# Patient Record
Sex: FEMALE | Race: BLACK OR AFRICAN AMERICAN | Employment: FULL TIME | URBAN - METROPOLITAN AREA
[De-identification: names, ages, dates, MRNs, and addresses within clinical notes are randomized per-mention and may not be internally consistent; named-entity substitution may affect disease eponyms.]

---

## 2017-03-02 ENCOUNTER — OFFICE VISIT (OUTPATIENT)
Dept: FAMILY MEDICINE CLINIC | Age: 57
End: 2017-03-02

## 2017-03-02 VITALS
WEIGHT: 131 LBS | SYSTOLIC BLOOD PRESSURE: 102 MMHG | OXYGEN SATURATION: 100 % | DIASTOLIC BLOOD PRESSURE: 70 MMHG | HEART RATE: 67 BPM | TEMPERATURE: 98.7 F | RESPIRATION RATE: 18 BRPM | HEIGHT: 63 IN | BODY MASS INDEX: 23.21 KG/M2

## 2017-03-02 DIAGNOSIS — M79.7 FIBROMYALGIA: ICD-10-CM

## 2017-03-02 DIAGNOSIS — M50.30 DDD (DEGENERATIVE DISC DISEASE), CERVICAL: Primary | ICD-10-CM

## 2017-03-02 DIAGNOSIS — M51.36 DDD (DEGENERATIVE DISC DISEASE), LUMBAR: ICD-10-CM

## 2017-03-02 DIAGNOSIS — Z11.59 SCREENING FOR VIRAL DISEASE: ICD-10-CM

## 2017-03-02 RX ORDER — TRAMADOL HYDROCHLORIDE 50 MG/1
TABLET ORAL
Qty: 90 TAB | Refills: 2 | Status: SHIPPED | OUTPATIENT
Start: 2017-03-02 | End: 2017-05-23 | Stop reason: SDUPTHER

## 2017-03-02 NOTE — MR AVS SNAPSHOT
Visit Information Date & Time Provider Department Dept. Phone Encounter #  
 3/2/2017  1:30 PM Luis A Ureña NP [address] [facility] [phone] [id number] Follow-up Instructions Return if symptoms worsen or fail to improve. Upcoming Health Maintenance Date Due Hepatitis C Screening 1960 FOBT Q 1 YEAR AGE 50-75 7/20/2017 BREAST CANCER SCRN MAMMOGRAM 11/21/2018 PAP AKA CERVICAL CYTOLOGY 11/21/2019 DTaP/Tdap/Td series (2 - Td) 11/21/2026 Allergies as of 3/2/2017  Review Complete On: 3/2/2017 By: Luis A Ureña NP Severity Noted Reaction Type Reactions Doxycycline  12/06/2013    Other (comments)  
 headache  
 Sulfa (Sulfonamide Antibiotics)  12/06/2013    Other (comments)  
 headache Current Immunizations  Never Reviewed No immunizations on file. Not reviewed this visit You Were Diagnosed With   
  
 Codes Comments DDD (degenerative disc disease), cervical    -  Primary ICD-10-CM: M50.30 ICD-9-CM: 722.4 DDD (degenerative disc disease), lumbar     ICD-10-CM: M51.36 
ICD-9-CM: 722.52 Fibromyalgia     ICD-10-CM: M79.7 ICD-9-CM: 729.1 Vitals BP  
  
  
  
  
  
 102/70 (BP 1 Location: Left arm, BP Patient Position: Sitting) Vitals History BMI and BSA Data Body Mass Index Body Surface Area  
 23.21 kg/m 2 1.62 m 2 Preferred Pharmacy Pharmacy Name Phone [id number] [place] [address] [phone] Your Updated Medication List  
  
   
This list is accurate as of: 3/2/17  1:52 PM.  Always use your most recent med list.  
  
  
  
  
 meloxicam 15 mg tablet Commonly known as:  MOBIC Take 1 Tab by mouth daily. methocarbamol 750 mg tablet Commonly known as:  ROBAXIN Take one tablet by mouth three times daily as needed  
  
 pantoprazole 40 mg tablet Commonly known as:  PROTONIX TAKE 1 TABLET BY MOUTH TWICE A DAY  
  
 scopolamine 1.5 mg (1 mg over 3 days) Pt3d Commonly known as:  TRANSDERM-SCOP  
1 Patch by TransDERmal route every seventy-two (72) hours. For motion sickness  
  
 traMADol 50 mg tablet Commonly known as:  ULTRAM  
TAKE ONE TABLET BY MOUTH EVERY SIX HOURS AS NEEDED FOR PAIN Prescriptions Printed Refills  
 traMADol (ULTRAM) 50 mg tablet 2 Sig: TAKE ONE TABLET BY MOUTH EVERY SIX HOURS AS NEEDED FOR PAIN Class: Print Follow-up Instructions Return if symptoms worsen or fail to improve. Introducing [facility]! Dear Jorge May: Thank you for requesting a Rhapsody account. Our records indicate that you already have an active Rhapsody account. You can access your account anytime at [email or web address] (Formerly Play-i). LumiGrow/Wonder Workshop (Formerly Play-i) Did you know that you can access your hospital and ER discharge instructions at any time in Rhapsody? You can also review all of your test results from your hospital stay or ER visit. Additional Information If you have questions, please visit the Frequently Asked Questions section of the Rhapsody website at [email or web address] (Formerly Play-i). LumiGrow/Wonder Workshop (Formerly Play-i)/. Remember, Rhapsody is NOT to be used for urgent needs. For medical emergencies, dial 911. Now available from your iPhone and Android! Please provide this summary of care documentation to your next provider. Your primary care clinician is listed as Aura Bills. If you have any questions after today's visit, please call [phone].

## 2017-03-02 NOTE — PROGRESS NOTES
Chief Complaint   Patient presents with    Pain (Chronic)     fibromyalgia    Medication Evaluation     she is a 49y.o. year old female who presents for evalution. Pt here for Tramadol refills. Was given Mobic and Diclofenac gel and states Mobic didn't help at all. Diclofenac gel helped for week and then stopped helping as much. Pt has been seeing new chiropractor and feels like neck pain has been improving. Reviewed PmHx, RxHx, FmHx, SocHx, AllgHx and updated and dated in the chart. Review of Systems - negative except as listed above in the HPI    Objective:     Vitals:    03/02/17 1336   BP: 102/70   Pulse: 67   Resp: 18   Temp: 98.7 °F (37.1 °C)   TempSrc: Oral   SpO2: 100%   Weight: 131 lb (59.4 kg)   Height: 5' 3\" (1.6 m)     Physical Examination: General appearance - alert, well appearing, and in no distress  Chest - clear to auscultation, no wheezes, rales or rhonchi, symmetric air entry  Heart - normal rate, regular rhythm, normal S1, S2, no murmurs, rubs, clicks or gallops    Assessment/ Plan:   Isis was seen today for pain (chronic) and medication evaluation. Diagnoses and all orders for this visit:    DDD (degenerative disc disease), cervical  -     traMADol (ULTRAM) 50 mg tablet; TAKE ONE TABLET BY MOUTH EVERY SIX HOURS AS NEEDED FOR PAIN  Reviewed , no data on patient. Refills provided. F/U 3 mo or as needed. DDD (degenerative disc disease), lumbar  -     traMADol (ULTRAM) 50 mg tablet; TAKE ONE TABLET BY MOUTH EVERY SIX HOURS AS NEEDED FOR PAIN    Fibromyalgia  -     traMADol (ULTRAM) 50 mg tablet; TAKE ONE TABLET BY MOUTH EVERY SIX HOURS AS NEEDED FOR PAIN    Screening for viral disease  -     HEPATITIS C AB    Pt voiced understanding regarding plan of care. Follow-up Disposition:  Return if symptoms worsen or fail to improve. I have discussed the diagnosis with the patient and the intended plan as seen in the above orders.   The patient has received an after-visit summary and questions were answered concerning future plans.      Medication Side Effects and Warnings were discussed with patient    Kirsten Orta NP

## 2017-03-02 NOTE — PROGRESS NOTES
Chief Complaint   Patient presents with    Pain (Chronic)     fibromyalgia    Medication Evaluation     Patient in office today for med check; have no c/o.    1. Have you been to the ER, urgent care clinic since your last visit? Hospitalized since your last visit? No    2. Have you seen or consulted any other health care providers outside of the [address] [place] [address] since your last visit? Include any pap smears or colon screening.  No

## 2017-03-03 LAB — HCV AB S/CO SERPL IA: <0.1 S/CO RATIO (ref 0–0.9)

## 2017-05-01 DIAGNOSIS — M79.7 FIBROMYALGIA: ICD-10-CM

## 2017-05-01 DIAGNOSIS — M50.30 DDD (DEGENERATIVE DISC DISEASE), CERVICAL: ICD-10-CM

## 2017-05-01 RX ORDER — METHOCARBAMOL 750 MG/1
TABLET, FILM COATED ORAL
Qty: 180 TAB | Refills: 3 | Status: SHIPPED | OUTPATIENT
Start: 2017-05-01 | End: 2017-05-02 | Stop reason: SDUPTHER

## 2017-05-02 ENCOUNTER — TELEPHONE (OUTPATIENT)
Dept: FAMILY MEDICINE CLINIC | Age: 57
End: 2017-05-02

## 2017-05-02 DIAGNOSIS — M79.7 FIBROMYALGIA: ICD-10-CM

## 2017-05-02 DIAGNOSIS — M50.30 DDD (DEGENERATIVE DISC DISEASE), CERVICAL: ICD-10-CM

## 2017-05-02 RX ORDER — METHOCARBAMOL 750 MG/1
TABLET, FILM COATED ORAL
Qty: 30 TAB | Refills: 0 | Status: SHIPPED | OUTPATIENT
Start: 2017-05-02 | End: 2017-10-12 | Stop reason: SDUPTHER

## 2017-05-02 NOTE — TELEPHONE ENCOUNTER
Pt is out of Methocarbamol, pt is asking if you can send in a small amount to local pharmacy on file until she receives her mail order rx. CB# [phone].

## 2017-05-23 ENCOUNTER — OFFICE VISIT (OUTPATIENT)
Dept: FAMILY MEDICINE CLINIC | Age: 57
End: 2017-05-23

## 2017-05-23 VITALS
BODY MASS INDEX: 23.1 KG/M2 | RESPIRATION RATE: 18 BRPM | DIASTOLIC BLOOD PRESSURE: 86 MMHG | SYSTOLIC BLOOD PRESSURE: 134 MMHG | TEMPERATURE: 98.4 F | HEART RATE: 75 BPM | HEIGHT: 63 IN | OXYGEN SATURATION: 99 % | WEIGHT: 130.38 LBS

## 2017-05-23 DIAGNOSIS — R51.9 CHRONIC DAILY HEADACHE: Primary | ICD-10-CM

## 2017-05-23 DIAGNOSIS — M79.7 FIBROMYALGIA: ICD-10-CM

## 2017-05-23 DIAGNOSIS — M51.36 DDD (DEGENERATIVE DISC DISEASE), LUMBAR: ICD-10-CM

## 2017-05-23 DIAGNOSIS — M50.30 DDD (DEGENERATIVE DISC DISEASE), CERVICAL: ICD-10-CM

## 2017-05-23 RX ORDER — TRAMADOL HYDROCHLORIDE 50 MG/1
TABLET ORAL
Qty: 90 TAB | Refills: 2 | Status: SHIPPED | OUTPATIENT
Start: 2017-05-23 | End: 2018-10-22 | Stop reason: ALTCHOICE

## 2017-05-23 NOTE — PROGRESS NOTES
1. Have you been to the ER, urgent care clinic since your last visit? Hospitalized since your last visit? No    2. Have you seen or consulted any other health care providers outside of the [address] [place] [address] since your last visit? Include any pap smears or colon screening.  No   Chief Complaint   Patient presents with    Medication Refill     tramadol refill

## 2017-05-23 NOTE — PROGRESS NOTES
Chief Complaint   Patient presents with    Medication Refill     tramadol refill     she is a 49y.o. year old female who presents for evalution. Pt here today to discuss chronic headaches. Has been taking Tramadol but doesn't really help. Has also been seeing [facility] as well for this problem - making some dietary changes, has some hormone testing pending still, otherwise labs have been looking good. Pt states headaches have been worsening. We have discussed previously Occipital Nerve Block injections, here today to get more information. Pt has seen Neuro previously but hasn't helped. Reviewed PmHx, RxHx, FmHx, SocHx, AllgHx and updated and dated in the chart. Review of Systems - negative except as listed above in the HPI    Objective:     Vitals:    05/23/17 1020   BP: 134/86   Pulse: 75   Resp: 18   Temp: 98.4 °F (36.9 °C)   TempSrc: Oral   SpO2: 99%   Weight: 130 lb 6 oz (59.1 kg)   Height: 5' 3\" (1.6 m)     Physical Examination: General appearance - alert, well appearing, and in no distress  Neck - supple, no significant adenopathy  Chest - clear to auscultation, no wheezes, rales or rhonchi, symmetric air entry  Heart - normal rate, regular rhythm, normal S1, S2, no murmurs, rubs, clicks or gallops  Neurological - alert, oriented, normal speech, no focal findings or movement disorder noted, cranial nerves II through XII intact, motor and sensory grossly normal bilaterally    Assessment/ Plan:   Isis was seen today for medication refill. Diagnoses and all orders for this visit:    Chronic daily headache  -     traMADol (ULTRAM) 50 mg tablet; TAKE ONE TABLET BY MOUTH EVERY SIX HOURS AS NEEDED FOR PAIN  -     REFERRAL TO PAIN MANAGEMENT  Slightly early but will refill. Pain contract filed. Reviewed , no suspicious fills. F/U with PMR to discuss occipital injections. Also given supplement information which can help with chronic pain.      Fibromyalgia  - traMADol (ULTRAM) 50 mg tablet; TAKE ONE TABLET BY MOUTH EVERY SIX HOURS AS NEEDED FOR PAIN    DDD (degenerative disc disease), cervical  -     traMADol (ULTRAM) 50 mg tablet; TAKE ONE TABLET BY MOUTH EVERY SIX HOURS AS NEEDED FOR PAIN  -     REFERRAL TO PAIN MANAGEMENT    DDD (degenerative disc disease), lumbar  -     traMADol (ULTRAM) 50 mg tablet; TAKE ONE TABLET BY MOUTH EVERY SIX HOURS AS NEEDED FOR PAIN    Pt voiced understanding regarding plan of care. Follow-up Disposition:  Return in about 3 months (around 8/23/2017) for Tramadol . I have discussed the diagnosis with the patient and the intended plan as seen in the above orders. The patient has received an after-visit summary and questions were answered concerning future plans.      Medication Side Effects and Warnings were discussed with patient    Nixon Donato NP

## 2017-05-23 NOTE — MR AVS SNAPSHOT
Visit Information Date & Time Provider Department Dept. Phone Encounter #  
 5/23/2017 10:15 AM Feroz Mclean NP [address] [facility] [phone] [id number] Follow-up Instructions Return in about 3 months (around 8/23/2017) for Tramadol . Upcoming Health Maintenance Date Due FOBT Q 1 YEAR AGE 50-75 7/20/2017 INFLUENZA AGE 9 TO ADULT 8/1/2017 BREAST CANCER SCRN MAMMOGRAM 11/21/2018 PAP AKA CERVICAL CYTOLOGY 11/21/2019 DTaP/Tdap/Td series (2 - Td) 11/21/2026 Allergies as of 5/23/2017  Review Complete On: 5/23/2017 By: Feroz Mclean NP Severity Noted Reaction Type Reactions Doxycycline  12/06/2013    Other (comments)  
 headache  
 Sulfa (Sulfonamide Antibiotics)  12/06/2013    Other (comments)  
 headache Current Immunizations  Never Reviewed No immunizations on file. Not reviewed this visit You Were Diagnosed With   
  
 Codes Comments Chronic daily headache    -  Primary ICD-10-CM: P67 ICD-9-CM: 784.0 Fibromyalgia     ICD-10-CM: M79.7 ICD-9-CM: 729.1 DDD (degenerative disc disease), cervical     ICD-10-CM: M50.30 ICD-9-CM: 722.4 DDD (degenerative disc disease), lumbar     ICD-10-CM: M51.36 
ICD-9-CM: 722.52 Vitals BP Pulse Temp Resp Height(growth percentile) Weight(growth percentile) 134/86 (BP 1 Location: Left arm, BP Patient Position: Sitting) 75 98.4 °F (36.9 °C) (Oral) 18 5' 3\" (1.6 m) 130 lb 6 oz (59.1 kg) SpO2 BMI OB Status Smoking Status 99% 23.09 kg/m2 Menopause Never Smoker Vitals History BMI and BSA Data Body Mass Index Body Surface Area 23.09 kg/m 2 1.62 m 2 Preferred Pharmacy Pharmacy Name Phone [id number] [address] [facility] [place] [address] [phone] Your Updated Medication List  
  
   
This list is accurate as of: 5/23/17 11:01 AM.  Always use your most recent med list.  
  
  
  
 meloxicam 15 mg tablet Commonly known as:  MOBIC Take 1 Tab by mouth daily. methocarbamol 750 mg tablet Commonly known as:  ROBAXIN Take one tablet by mouth three times daily as needed  
  
 pantoprazole 40 mg tablet Commonly known as:  PROTONIX  
TAKE 1 TABLET BY MOUTH TWICE A DAY  
  
 scopolamine 1.5 mg (1 mg over 3 days) Pt3d Commonly known as:  TRANSDERM-SCOP  
1 Patch by TransDERmal route every seventy-two (72) hours. For motion sickness  
  
 traMADol 50 mg tablet Commonly known as:  ULTRAM  
TAKE ONE TABLET BY MOUTH EVERY SIX HOURS AS NEEDED FOR PAIN Prescriptions Printed Refills  
 traMADol (ULTRAM) 50 mg tablet 2 Sig: TAKE ONE TABLET BY MOUTH EVERY SIX HOURS AS NEEDED FOR PAIN Class: Print We Performed the Following REFERRAL TO PAIN MANAGEMENT [[id number] Custom] Comments:  
 Please evaluate patient for chronic daily headache, chronic neck pain Follow-up Instructions Return in about 3 months (around 8/23/2017) for Tramadol . Referral Information Referral ID Referred By Referred To  
  
 [id number] Sakshi NICKERSON, [address] [place], [address] [place] [address] Phone: [phone] Fax: [phone] Visits Status Start Date End Date 1 New Request 5/23/17 5/23/18 If your referral has a status of pending review or denied, additional information will be sent to support the outcome of this decision. Introducing [facility]! Dear Margarita Qiu: Thank you for requesting a Taykey account. Our records indicate that you already have an active Taykey account. You can access your account anytime at [email or web address] Media/Wouzee Media Did you know that you can access your hospital and ER discharge instructions at any time in Taykey? You can also review all of your test results from your hospital stay or ER visit. Additional Information If you have questions, please visit the Frequently Asked Questions section of the Lili B Enterpriseshart website at [email or web address] Remember, Zoosk is NOT to be used for urgent needs. For medical emergencies, dial 911. Now available from your iPhone and Android! Please provide this summary of care documentation to your next provider. Your primary care clinician is listed as Buddy Wood. If you have any questions after today's visit, please call [phone].

## 2017-05-23 NOTE — LETTER
[address] :1960 MR #:[id number] Provider Naman Valente NP  
*LXDT-056* BSMG-491 () Page 1 of 5 Initial Phoneplus CONTROLLED SUBSTANCE AGREEMENT I may be prescribed medications that are controlled substances as part  of my treatment plan for management of my medical condition(s). The goal of my treatment plan is to maintain and/or improve my health and wellbeing. Because controlled substances have an increased risk of abuse or harm, continual re-evaluation is needed determine if the goals of my treatment plan are being met for my safety and the safety of others. Naijos Strauss  am entering into this Controlled Substance Agreement with my provider, Alessandro Giang NP at [address] . I understand that successful treatment requires mutual trust and honesty between me and my provider. I understand that there are state and federal laws and regulations which apply to the medications that my provider may prescribe that must be followed. I understand there are risks and benefits ts of taking the medicines that my provider may prescribe. I understand and agree that following this Agreement is necessary in continuing my provider-patient relationship and success of my treatment plan. As a part of my treatment plan, I agree to the following: COMMUNICATION: 
 
1. I will communicate fully with my provider about my medical condition(s), including the effect on my daily life and how well my medications are helping. I will tell my provider all of the medications that I take for any reason, including medications I receive from another health care provider, and will notify my provider about all issues, problems or concerns, including any side effects, which may be related to my medications. I understand that this information allows my provider to adjust my treatment plan to help manage my medical condition.  I understand that this information will become part of my permanent medical record. 2. I will notify my provider if I have a history of alcohol/drug misuse/addiction or if I have had treatment for alcohol/drug addiction in the past, or if I have a new problem with or concern about alcohol/drug use/addiction, because this increases the likelihood of high risk behaviors and may lead to serious medical conditions. 3. Females Only: I will notify my provider if I am or become pregnant, or if I intend to become pregnant, or if I intend to breastfeed. I understand that communication of these issues with my provider is important, due to possible effects my medication could have on an unborn fetus or breastfeeding child. [address] :1960 MR #:[id number] Provider Cindi Sicard, NP  
*OXIB-337* BSMG-491 () Page 2 of 5 Initial SMARTworks MISUSE OF MEDICATIONS / DRUGS: 
 
1. I agree to take all controlled substances as prescribed, and will not misuse or abuse any controlled substances prescribed by my provider. For my safety, I will not increase the amount of medicine I take without first talking with and getting permission from my provider. 2. If I have a medical emergency, another health care provider may prescribe me medication. If I seek emergency treatment, I will notify my provider within seventy-two (72) hours. 3. I understand that my provider may discuss my use and/or possible misuse/abuse of controlled substances and alcohol, as appropriate, with any health care provider involved in my care, pharmacist or legal authority. ILLEGAL DRUGS: 
 
1. I will not use illegal drugs of any kind, including but not limited to marijuana, heroin, cocaine, or any prescription drug which is not prescribed to me. DRUG DIVERSION / PRESCRIPTION FRAUD: 
 
1. I will not share, sell, trade, give away, or otherwise misuse my prescriptions or medications. 2. I will not alter any prescriptions provided to me by my provider. SINGLE PROVIDER: 
 
1. I agree that all controlled substances that I take will be prescribed only by my provider (or his/her covering provider) under this Agreement. This agreement does not prevent me from seeking emergency medical treatment or receiving pain management related to a surgery. PROTECTING MEDICATIONS: 
 
1. I am responsible for keeping my prescriptions and medications in a safe and secure place including safeguarding them from loss or theft. I understand that lost, stolen or damaged/destroyed prescriptions or medications will not be replaced. [address] :1960 MR #:[id number] Provider Caridad Cohen NP  
*EEFO-080* BSMG-491 () Page 3 of 5 Initial Winkcam PRESCRIPTION RENEWALS/REFILLS: 
 
1. I will follow my controlled substance medication schedule as prescribed by my provider. 2. I understand and agree that I will make any requests for renewals or refills of my prescriptions only at the time of an office visit or during my providers regular office hours subject to the prescription refill requirements of the individual practice. 3. I understand that my provider may not call in prescriptions for controlled substances to my pharmacy. 4. I understand that my provider may adjust or discontinue these medications as deemed appropriate for my medical treatment plan. This Agreement does not guarantee the prescription of controlled medications. 5. I agree that if my medications are adjusted or discontinued, I will properly dispose of any remaining medications. I understand that I will be required to dispose of any remaining controlled medications prior to being provided with any prescriptions for other controlled medications.  
 
 
1. I authorize my provider and my pharmacy to cooperate fully with any local, state, or federal law enforcement agency in the investigation of any possible misuse, sale, or other diversion of my controlled substance prescriptions or medications. RISKS: 
 
 
1. I understand that if I do not adhere to this Agreement in any way, my provider may change my prescriptions, stop prescribing controlled substances or end our provider-patient relationship. 2. If my provider decides to stop prescribing medication, or decides to end our provider-patient relationship,my provider may require that I taper my medications slowly. If necessary, my provider may also provide a prescription for other medications to treat my withdrawal symptoms. UNDERSTANDING THIS AGREEMENT: 
 
I understand that my provider may adjust or stop my prescriptions for controlled substances based on my medical condition and my treatment plan. I understand that this Agreement does not guarantee that I will be prescribed medications or controlled substances. I understand that controlled substances may be just one part 
of my treatment plan. My initial on each page and my signature below shows that I have read each page of this Agreement, I have had an opportunity to ask questions, and all of my questions have been answered to my satisfaction by my provider. By signing below, I agree to comply with this Agreement, and I understand that if I do not follow the Agreements listed above, my provider may stop 
 
 
 
_________________________________________  Date/Time 5/23/2017 10:56 AM   
             (Patient Signature)

## 2017-10-12 ENCOUNTER — TELEPHONE (OUTPATIENT)
Dept: FAMILY MEDICINE CLINIC | Age: 57
End: 2017-10-12

## 2017-10-12 DIAGNOSIS — M50.30 DDD (DEGENERATIVE DISC DISEASE), CERVICAL: ICD-10-CM

## 2017-10-12 DIAGNOSIS — M79.7 FIBROMYALGIA: ICD-10-CM

## 2017-10-12 RX ORDER — METHOCARBAMOL 750 MG/1
TABLET, FILM COATED ORAL
Qty: 30 TAB | Refills: 0 | Status: SHIPPED | OUTPATIENT
Start: 2017-10-12 | End: 2018-10-22 | Stop reason: SDUPTHER

## 2017-10-12 NOTE — TELEPHONE ENCOUNTER
----- Message from Aicha Du sent at 10/12/2017 12:24 PM EDT -----  Regarding: Ari Vergara / Telephone  Pt is experiencing a delay in getting her \"methocarbamol 750 MG\" from the Mail order pharmacy so pt was wondering if  the office could call her in some pills to the local [facility] pharmacy on [address] [place] [address] in [place] and best contact number is [phone].

## 2018-10-22 ENCOUNTER — OFFICE VISIT (OUTPATIENT)
Dept: FAMILY MEDICINE CLINIC | Age: 58
End: 2018-10-22

## 2018-10-22 VITALS
WEIGHT: 136 LBS | HEIGHT: 63 IN | OXYGEN SATURATION: 99 % | SYSTOLIC BLOOD PRESSURE: 105 MMHG | HEART RATE: 59 BPM | DIASTOLIC BLOOD PRESSURE: 74 MMHG | BODY MASS INDEX: 24.1 KG/M2 | RESPIRATION RATE: 17 BRPM | TEMPERATURE: 98.1 F

## 2018-10-22 DIAGNOSIS — M50.30 DDD (DEGENERATIVE DISC DISEASE), CERVICAL: ICD-10-CM

## 2018-10-22 DIAGNOSIS — M79.7 FIBROMYALGIA: ICD-10-CM

## 2018-10-22 DIAGNOSIS — Z00.00 WELL ADULT EXAM: Primary | ICD-10-CM

## 2018-10-22 RX ORDER — BISMUTH SUBSALICYLATE 262 MG
1 TABLET,CHEWABLE ORAL DAILY
COMMUNITY

## 2018-10-22 RX ORDER — ASCORBIC ACID 500 MG
1000 TABLET ORAL 2 TIMES DAILY
COMMUNITY

## 2018-10-22 RX ORDER — METHOCARBAMOL 750 MG/1
TABLET, FILM COATED ORAL
Qty: 30 TAB | Refills: 0 | Status: SHIPPED | OUTPATIENT
Start: 2018-10-22 | End: 2018-10-22 | Stop reason: SDUPTHER

## 2018-10-22 RX ORDER — METHOCARBAMOL 750 MG/1
TABLET, FILM COATED ORAL
Qty: 90 TAB | Refills: 3 | Status: SHIPPED | OUTPATIENT
Start: 2018-10-22 | End: 2019-05-16 | Stop reason: SDUPTHER

## 2018-10-22 RX ORDER — TURMERIC 400 MG
1 CAPSULE ORAL 2 TIMES DAILY
COMMUNITY

## 2018-10-22 RX ORDER — SCOLOPAMINE TRANSDERMAL SYSTEM 1 MG/1
1.5 PATCH, EXTENDED RELEASE TRANSDERMAL
Qty: 4 PATCH | Refills: 0 | Status: SHIPPED | OUTPATIENT
Start: 2018-10-22 | End: 2020-03-16 | Stop reason: ALTCHOICE

## 2018-10-22 RX ORDER — LANOLIN ALCOHOL/MO/W.PET/CERES
1 CREAM (GRAM) TOPICAL 2 TIMES DAILY
COMMUNITY

## 2018-10-22 NOTE — PROGRESS NOTES
Chief Complaint   Patient presents with    Complete Physical    Medication Refill      1. Have you been to the ER, urgent care clinic since your last visit? Hospitalized since your last visit? No    2. Have you seen or consulted any other health care providers outside of the [address] [place] [address] since your last visit? Include any pap smears or colon screening.  No

## 2018-10-22 NOTE — PROGRESS NOTES
Subjective:   56 y.o. female for Well Woman Check. Her gyne and breast care is done elsewhere by her Ob-Gyne physician. Patient Active Problem List    Diagnosis Date Noted    Fibromyalgia 12/06/2013    DDD (degenerative disc disease), cervical 12/06/2013    DDD (degenerative disc disease), lumbar 12/06/2013     Current Outpatient Medications   Medication Sig Dispense Refill    scopolamine (TRANSDERM-SCOP) 1 mg over 3 days pt3d 1 Patch by TransDERmal route every seventy-two (72) hours. For motion sickness 4 Patch 0    calcium/mag/vitamin D2/Zn/min (ADAM-MAG ZINC II PO) Take  by mouth.  Omega-3 Fatty Acids 60- mg cpDR Take  by mouth.  multivitamin (ONE A DAY) tablet Take 1 Tab by mouth daily.  TURMERIC PO Take  by mouth.  B.infantis-B.ani-B.long-B.bifi (PROBIOTIC 4X) 10-15 mg TbEC Take  by mouth.  ascorbic acid, vitamin C, (VITAMIN C) 500 mg tablet Take 1,000 mg by mouth.  glucosamine-chondroitin (ARTHX) 500-400 mg cap Take 1 Cap by mouth daily.  methocarbamol (ROBAXIN) 750 mg tablet Take one tablet by mouth three times daily as needed 90 Tab 3     Family History   Problem Relation Age of Onset    Parkinsonism Father      Social History     Tobacco Use    Smoking status: Never Smoker    Smokeless tobacco: Never Used   Substance Use Topics    Alcohol use: Yes     Alcohol/week: 1.0 oz     Types: 2 Glasses of wine per week             ROS: Feeling generally well. No TIA's or unusual headaches, no dysphagia. No prolonged cough. No dyspnea or chest pain on exertion. No abdominal pain, change in bowel habits, black or bloody stools. No urinary tract symptoms. No new or unusual musculoskeletal symptoms. Specific concerns today: needs to get refill of her scolpalamine patch for an upcoming cruise, due for refill of her robaxin. Objective: The patient appears well, alert, oriented x 3, in no distress.   Visit Vitals  /74 (BP 1 Location: Right arm, BP Patient Position: Sitting)   Pulse (!) 59   Temp 98.1 °F (36.7 °C)   Resp 17   Ht 5' 3\" (1.6 m)   Wt 136 lb (61.7 kg)   SpO2 99%   BMI 24.09 kg/m²     ENT normal.  Neck supple. No adenopathy or thyromegaly. FERNANDO. Lungs are clear, good air entry, no wheezes, rhonchi or rales. S1 and S2 normal, no murmurs, regular rate and rhythm. Abdomen soft without tenderness, guarding, mass or organomegaly. Extremities show no edema, normal peripheral pulses. Neurological is normal, no focal findings. Breast and Pelvic exams are deferred. Assessment/Plan:   Well Woman  lose weight, increase physical activity, follow low fat diet, follow low salt diet, routine labs ordered  Encounter Diagnoses   Name Primary?  Well adult exam Yes    Fibromyalgia     DDD (degenerative disc disease), cervical      Orders Placed This Encounter    LIPID PANEL    CBC WITH AUTOMATED DIFF    METABOLIC PANEL, COMPREHENSIVE    TSH 3RD GENERATION    DISCONTD: methocarbamol (ROBAXIN) 750 mg tablet    scopolamine (TRANSDERM-SCOP) 1 mg over 3 days pt3d    calcium/mag/vitamin D2/Zn/min (ADAM-MAG ZINC II PO)    Omega-3 Fatty Acids 60- mg cpDR    multivitamin (ONE A DAY) tablet    TURMERIC PO    B.infantis-B.ani-B.long-B.bifi (PROBIOTIC 4X) 10-15 mg TbEC    ascorbic acid, vitamin C, (VITAMIN C) 500 mg tablet    glucosamine-chondroitin (ARTHX) 500-400 mg cap    methocarbamol (ROBAXIN) 750 mg tablet     I have discussed the diagnosis with the patient and the intended plan as seen in the above orders. The patient has received an after-visit summary and questions were answered concerning future plans. Patient conveyed understanding of the plan at the time of the visit.     Nubia Frankel, MSN, ANP  10/22/2018

## 2018-10-23 LAB
ALBUMIN SERPL-MCNC: 4.6 G/DL (ref 3.5–5.5)
ALBUMIN/GLOB SERPL: 1.7 {RATIO} (ref 1.2–2.2)
ALP SERPL-CCNC: 101 IU/L (ref 39–117)
ALT SERPL-CCNC: 12 IU/L (ref 0–32)
AST SERPL-CCNC: 20 IU/L (ref 0–40)
BASOPHILS # BLD AUTO: 0.1 X10E3/UL (ref 0–0.2)
BASOPHILS NFR BLD AUTO: 1 %
BILIRUB SERPL-MCNC: 0.2 MG/DL (ref 0–1.2)
BUN SERPL-MCNC: 17 MG/DL (ref 6–24)
BUN/CREAT SERPL: 24 (ref 9–23)
CALCIUM SERPL-MCNC: 9.3 MG/DL (ref 8.7–10.2)
CHLORIDE SERPL-SCNC: 102 MMOL/L (ref 96–106)
CHOLEST SERPL-MCNC: 196 MG/DL (ref 100–199)
CO2 SERPL-SCNC: 23 MMOL/L (ref 20–29)
CREAT SERPL-MCNC: 0.72 MG/DL (ref 0.57–1)
EOSINOPHIL # BLD AUTO: 0.1 X10E3/UL (ref 0–0.4)
EOSINOPHIL NFR BLD AUTO: 2 %
ERYTHROCYTE [DISTWIDTH] IN BLOOD BY AUTOMATED COUNT: 14.2 % (ref 12.3–15.4)
GLOBULIN SER CALC-MCNC: 2.7 G/DL (ref 1.5–4.5)
GLUCOSE SERPL-MCNC: 85 MG/DL (ref 65–99)
HCT VFR BLD AUTO: 38.7 % (ref 34–46.6)
HDLC SERPL-MCNC: 81 MG/DL
HGB BLD-MCNC: 12.4 G/DL (ref 11.1–15.9)
IMM GRANULOCYTES # BLD: 0 X10E3/UL (ref 0–0.1)
IMM GRANULOCYTES NFR BLD: 0 %
INTERPRETATION, 910389: NORMAL
LDLC SERPL CALC-MCNC: 95 MG/DL (ref 0–99)
LYMPHOCYTES # BLD AUTO: 1.8 X10E3/UL (ref 0.7–3.1)
LYMPHOCYTES NFR BLD AUTO: 30 %
MCH RBC QN AUTO: 30.2 PG (ref 26.6–33)
MCHC RBC AUTO-ENTMCNC: 32 G/DL (ref 31.5–35.7)
MCV RBC AUTO: 94 FL (ref 79–97)
MONOCYTES # BLD AUTO: 0.4 X10E3/UL (ref 0.1–0.9)
MONOCYTES NFR BLD AUTO: 7 %
NEUTROPHILS # BLD AUTO: 3.6 X10E3/UL (ref 1.4–7)
NEUTROPHILS NFR BLD AUTO: 60 %
PLATELET # BLD AUTO: 244 X10E3/UL (ref 150–379)
POTASSIUM SERPL-SCNC: 4.1 MMOL/L (ref 3.5–5.2)
PROT SERPL-MCNC: 7.3 G/DL (ref 6–8.5)
RBC # BLD AUTO: 4.11 X10E6/UL (ref 3.77–5.28)
SODIUM SERPL-SCNC: 144 MMOL/L (ref 134–144)
TRIGL SERPL-MCNC: 99 MG/DL (ref 0–149)
TSH SERPL DL<=0.005 MIU/L-ACNC: 0.75 UIU/ML (ref 0.45–4.5)
VLDLC SERPL CALC-MCNC: 20 MG/DL (ref 5–40)
WBC # BLD AUTO: 5.9 X10E3/UL (ref 3.4–10.8)

## 2019-05-16 DIAGNOSIS — M50.30 DDD (DEGENERATIVE DISC DISEASE), CERVICAL: ICD-10-CM

## 2019-05-16 DIAGNOSIS — M79.7 FIBROMYALGIA: ICD-10-CM

## 2019-05-16 RX ORDER — METHOCARBAMOL 750 MG/1
TABLET, FILM COATED ORAL
Qty: 90 TAB | Refills: 3 | Status: SHIPPED | OUTPATIENT
Start: 2019-05-16 | End: 2020-03-16 | Stop reason: SDUPTHER

## 2020-03-16 ENCOUNTER — OFFICE VISIT (OUTPATIENT)
Dept: FAMILY MEDICINE CLINIC | Age: 60
End: 2020-03-16

## 2020-03-16 VITALS
DIASTOLIC BLOOD PRESSURE: 68 MMHG | HEIGHT: 63 IN | OXYGEN SATURATION: 98 % | WEIGHT: 129 LBS | HEART RATE: 80 BPM | TEMPERATURE: 99.2 F | BODY MASS INDEX: 22.86 KG/M2 | SYSTOLIC BLOOD PRESSURE: 95 MMHG | RESPIRATION RATE: 18 BRPM

## 2020-03-16 DIAGNOSIS — Z00.00 WELL ADULT EXAM: Primary | ICD-10-CM

## 2020-03-16 DIAGNOSIS — M79.7 FIBROMYALGIA: ICD-10-CM

## 2020-03-16 DIAGNOSIS — M50.30 DDD (DEGENERATIVE DISC DISEASE), CERVICAL: ICD-10-CM

## 2020-03-16 RX ORDER — METHOCARBAMOL 750 MG/1
TABLET, FILM COATED ORAL
Qty: 30 TAB | Refills: 0 | Status: SHIPPED | OUTPATIENT
Start: 2020-03-16 | End: 2020-10-26

## 2020-03-16 RX ORDER — METHOCARBAMOL 750 MG/1
TABLET, FILM COATED ORAL
Qty: 90 TAB | Refills: 3 | Status: SHIPPED | OUTPATIENT
Start: 2020-03-16 | End: 2020-03-16 | Stop reason: SDUPTHER

## 2020-03-16 NOTE — PROGRESS NOTES
Subjective:   64 y.o. female for Well Woman Check. Her gyne and breast care is done elsewhere by her Ob-Gyne physician. Patient Active Problem List    Diagnosis Date Noted    Fibromyalgia 12/06/2013    DDD (degenerative disc disease), cervical 12/06/2013    DDD (degenerative disc disease), lumbar 12/06/2013     Current Outpatient Medications   Medication Sig Dispense Refill    methocarbamoL (ROBAXIN) 750 mg tablet Take one tablet by mouth three times daily as needed 30 Tab 0    calcium/mag/vitamin D2/Zn/min (ADAM-MAG ZINC II PO) Take  by mouth.  Omega-3 Fatty Acids 60- mg cpDR Take  by mouth.  multivitamin (ONE A DAY) tablet Take 1 Tab by mouth daily.  TURMERIC PO Take  by mouth.  B.infantis-B.ani-B.long-B.bifi (PROBIOTIC 4X) 10-15 mg TbEC Take  by mouth.  ascorbic acid, vitamin C, (VITAMIN C) 500 mg tablet Take 1,000 mg by mouth.  glucosamine-chondroitin (ARTHX) 500-400 mg cap Take 1 Cap by mouth daily. Family History   Problem Relation Age of Onset    Parkinsonism Father      Social History     Tobacco Use    Smoking status: Never Smoker    Smokeless tobacco: Never Used   Substance Use Topics    Alcohol use: Yes     Alcohol/week: 1.7 standard drinks     Types: 2 Glasses of wine per week             ROS: Feeling generally well. No TIA's or unusual headaches, no dysphagia. No prolonged cough. No dyspnea or chest pain on exertion. No abdominal pain, change in bowel habits, black or bloody stools. No urinary tract symptoms. No new or unusual musculoskeletal symptoms. Specific concerns today: needs to get mail order refill of her robaxin for fibromyalgia. Objective: The patient appears well, alert, oriented x 3, in no distress.   Visit Vitals  BP 95/68 (BP 1 Location: Right arm, BP Patient Position: Sitting)   Pulse 80   Temp 99.2 °F (37.3 °C) (Oral)   Resp 18   Ht 5' 3\" (1.6 m)   Wt 129 lb (58.5 kg)   SpO2 98%   BMI 22.85 kg/m²     ENT normal.  Neck supple. No adenopathy or thyromegaly. FERNANDO. Lungs are clear, good air entry, no wheezes, rhonchi or rales. S1 and S2 normal, no murmurs, regular rate and rhythm. Abdomen soft without tenderness, guarding, mass or organomegaly. Extremities show no edema, normal peripheral pulses. Neurological is normal, no focal findings. Breast and Pelvic exams are deferred. Assessment/Plan:   Well Woman  lose weight, increase physical activity, follow low fat diet, follow low salt diet, routine labs ordered  Encounter Diagnoses   Name Primary?  Well adult exam Yes    Fibromyalgia     DDD (degenerative disc disease), cervical      Orders Placed This Encounter    LIPID PANEL    METABOLIC PANEL, COMPREHENSIVE    CBC WITH AUTOMATED DIFF    TSH 3RD GENERATION    DISCONTD: methocarbamoL (ROBAXIN) 750 mg tablet    methocarbamoL (ROBAXIN) 750 mg tablet     I have discussed the diagnosis with the patient and the intended plan as seen in the above orders. The patient has received an after-visit summary and questions were answered concerning future plans. Patient conveyed understanding of the plan at the time of the visit.     Naya Reed, MSN, ANP  3/16/2020

## 2020-03-16 NOTE — PROGRESS NOTES
Pt in office for CPE  Pt states she needs med refill    1. Have you been to the ER, urgent care clinic since your last visit? Hospitalized since your last visit? No    2. Have you seen or consulted any other health care providers outside of the [address] [place] [address] since your last visit? Include any pap smears or colon screening. No     Pt has no other concerns.

## 2020-10-26 DIAGNOSIS — M50.30 DDD (DEGENERATIVE DISC DISEASE), CERVICAL: ICD-10-CM

## 2020-10-26 DIAGNOSIS — M79.7 FIBROMYALGIA: ICD-10-CM

## 2020-10-26 RX ORDER — METHOCARBAMOL 750 MG/1
TABLET, FILM COATED ORAL
Qty: 90 TAB | Refills: 3 | Status: SHIPPED | OUTPATIENT
Start: 2020-10-26 | End: 2021-03-22 | Stop reason: ALTCHOICE

## 2021-03-22 ENCOUNTER — OFFICE VISIT (OUTPATIENT)
Dept: FAMILY MEDICINE CLINIC | Age: 61
End: 2021-03-22
Payer: COMMERCIAL

## 2021-03-22 VITALS
TEMPERATURE: 98.3 F | HEIGHT: 63 IN | SYSTOLIC BLOOD PRESSURE: 101 MMHG | OXYGEN SATURATION: 98 % | DIASTOLIC BLOOD PRESSURE: 62 MMHG | WEIGHT: 141 LBS | HEART RATE: 75 BPM | BODY MASS INDEX: 24.98 KG/M2 | RESPIRATION RATE: 18 BRPM

## 2021-03-22 DIAGNOSIS — Z00.00 WELL ADULT EXAM: Primary | ICD-10-CM

## 2021-03-22 PROCEDURE — 99396 PREV VISIT EST AGE 40-64: CPT | Performed by: NURSE PRACTITIONER

## 2021-03-22 RX ORDER — TIZANIDINE 4 MG/1
4 TABLET ORAL
Qty: 180 TAB | Refills: 3 | Status: SHIPPED | OUTPATIENT
Start: 2021-03-22 | End: 2021-07-23

## 2021-03-22 RX ORDER — CHOLECALCIFEROL (VITAMIN D3) 125 MCG
2000 CAPSULE ORAL 2 TIMES DAILY
COMMUNITY

## 2021-03-22 NOTE — PROGRESS NOTES
Chief Complaint   Patient presents with    Complete Physical    Labs     Patient is present today for a CPE and labs. Patient is not fasting. Pt expresses wanting to change the muscle relaxer. 1. Have you been to the ER, urgent care clinic since your last visit? Hospitalized since your last visit? No    2. Have you seen or consulted any other health care providers outside of the [address] [place] [address] since your last visit? Include any pap smears or colon screening.  No

## 2021-03-22 NOTE — PROGRESS NOTES
Subjective:   64 y.o. female for Well Woman Check. Her gyne and breast care is done elsewhere by her Ob-Gyne physician. Patient Active Problem List    Diagnosis Date Noted    Fibromyalgia 12/06/2013    DDD (degenerative disc disease), cervical 12/06/2013    DDD (degenerative disc disease), lumbar 12/06/2013     Current Outpatient Medications   Medication Sig Dispense Refill    cholecalciferol, vitamin D3, (Vitamin D3) 50 mcg (2,000 unit) tab Take  by mouth. Take twice daily      tiZANidine (ZANAFLEX) 4 mg tablet Take 1 Tab by mouth three (3) times daily as needed for Muscle Spasm(s). 180 Tab 3    calcium/mag/vitamin D2/Zn/min (ADAM-MAG ZINC II PO) Take  by mouth.  Omega-3 Fatty Acids 60- mg cpDR Take  by mouth.  multivitamin (ONE A DAY) tablet Take 1 Tab by mouth daily.  TURMERIC PO Take  by mouth.  B.infantis-B.ani-B.long-B.bifi (PROBIOTIC 4X) 10-15 mg TbEC Take  by mouth.  ascorbic acid, vitamin C, (VITAMIN C) 500 mg tablet Take 1,000 mg by mouth.  glucosamine-chondroitin (ARTHX) 500-400 mg cap Take 1 Cap by mouth daily. Family History   Problem Relation Age of Onset    Parkinsonism Father      Social History     Tobacco Use    Smoking status: Never Smoker    Smokeless tobacco: Never Used   Substance Use Topics    Alcohol use: Yes     Alcohol/week: 1.7 standard drinks     Types: 2 Glasses of wine per week         ROS: Feeling generally well. No TIA's or unusual headaches, no dysphagia. No prolonged cough. No dyspnea or chest pain on exertion. No abdominal pain, change in bowel habits, black or bloody stools. No urinary tract symptoms. No new or unusual musculoskeletal symptoms. Specific concerns today: would like to change back to Zanaflex from Baclofen. Objective: The patient appears well, alert, oriented x 3, in no distress.   Visit Vitals  /62 (BP 1 Location: Right arm, BP Patient Position: Sitting, BP Cuff Size: Small adult)   Pulse 75 Temp 98.3 °F (36.8 °C) (Temporal)   Resp 18   Ht 5' 3\" (1.6 m)   Wt 141 lb (64 kg)   SpO2 98%   BMI 24.98 kg/m²     ENT normal.  Neck supple. No adenopathy or thyromegaly. FERNANDO. Lungs are clear, good air entry, no wheezes, rhonchi or rales. S1 and S2 normal, no murmurs, regular rate and rhythm. Abdomen soft without tenderness, guarding, mass or organomegaly. Extremities show no edema, normal peripheral pulses. Neurological is normal, no focal findings. Breast and Pelvic exams are deferred. Assessment/Plan:   Well Woman  lose weight, increase physical activity, follow low fat diet, follow low salt diet, routine labs ordered  Encounter Diagnoses   Name Primary?  Well adult exam Yes     Orders Placed This Encounter    LIPID PANEL    CBC WITH AUTOMATED DIFF    METABOLIC PANEL, COMPREHENSIVE    TSH 3RD GENERATION    cholecalciferol, vitamin D3, (Vitamin D3) 50 mcg (2,000 unit) tab    tiZANidine (ZANAFLEX) 4 mg tablet     Labs updated today  Follow up 1 year if stable  I have discussed the diagnosis with the patient and the intended plan as seen in the above orders. The patient has received an after-visit summary and questions were answered concerning future plans. Patient conveyed understanding of the plan at the time of the visit.     Angelita Roy, MSN, ANP  3/22/2021

## 2021-03-23 LAB
ALBUMIN SERPL-MCNC: 4 G/DL (ref 3.5–5)
ALBUMIN/GLOB SERPL: 1.3 {RATIO} (ref 1.1–2.2)
ALP SERPL-CCNC: 130 U/L (ref 45–117)
ALT SERPL-CCNC: 22 U/L (ref 12–78)
ANION GAP SERPL CALC-SCNC: 4 MMOL/L (ref 5–15)
AST SERPL-CCNC: 14 U/L (ref 15–37)
BASOPHILS # BLD: 0.1 K/UL (ref 0–0.1)
BASOPHILS NFR BLD: 1 % (ref 0–1)
BILIRUB SERPL-MCNC: 0.3 MG/DL (ref 0.2–1)
BUN SERPL-MCNC: 17 MG/DL (ref 6–20)
BUN/CREAT SERPL: 23 (ref 12–20)
CALCIUM SERPL-MCNC: 9.2 MG/DL (ref 8.5–10.1)
CHLORIDE SERPL-SCNC: 104 MMOL/L (ref 97–108)
CHOLEST SERPL-MCNC: 204 MG/DL
CO2 SERPL-SCNC: 31 MMOL/L (ref 21–32)
CREAT SERPL-MCNC: 0.73 MG/DL (ref 0.55–1.02)
DIFFERENTIAL METHOD BLD: ABNORMAL
EOSINOPHIL # BLD: 0.1 K/UL (ref 0–0.4)
EOSINOPHIL NFR BLD: 2 % (ref 0–7)
ERYTHROCYTE [DISTWIDTH] IN BLOOD BY AUTOMATED COUNT: 14.2 % (ref 11.5–14.5)
GLOBULIN SER CALC-MCNC: 3.1 G/DL (ref 2–4)
GLUCOSE SERPL-MCNC: 102 MG/DL (ref 65–100)
HCT VFR BLD AUTO: 38.1 % (ref 35–47)
HDLC SERPL-MCNC: 90 MG/DL
HDLC SERPL: 2.3 {RATIO} (ref 0–5)
HGB BLD-MCNC: 11.9 G/DL (ref 11.5–16)
IMM GRANULOCYTES # BLD AUTO: 0 K/UL (ref 0–0.04)
IMM GRANULOCYTES NFR BLD AUTO: 1 % (ref 0–0.5)
LDLC SERPL CALC-MCNC: 92.8 MG/DL (ref 0–100)
LIPID PROFILE,FLP: ABNORMAL
LYMPHOCYTES # BLD: 1.7 K/UL (ref 0.8–3.5)
LYMPHOCYTES NFR BLD: 30 % (ref 12–49)
MCH RBC QN AUTO: 30.5 PG (ref 26–34)
MCHC RBC AUTO-ENTMCNC: 31.2 G/DL (ref 30–36.5)
MCV RBC AUTO: 97.7 FL (ref 80–99)
MONOCYTES # BLD: 0.6 K/UL (ref 0–1)
MONOCYTES NFR BLD: 10 % (ref 5–13)
NEUTS SEG # BLD: 3.1 K/UL (ref 1.8–8)
NEUTS SEG NFR BLD: 56 % (ref 32–75)
NRBC # BLD: 0 K/UL (ref 0–0.01)
NRBC BLD-RTO: 0 PER 100 WBC
PLATELET # BLD AUTO: 230 K/UL (ref 150–400)
PMV BLD AUTO: 11 FL (ref 8.9–12.9)
POTASSIUM SERPL-SCNC: 4.4 MMOL/L (ref 3.5–5.1)
PROT SERPL-MCNC: 7.1 G/DL (ref 6.4–8.2)
RBC # BLD AUTO: 3.9 M/UL (ref 3.8–5.2)
SODIUM SERPL-SCNC: 139 MMOL/L (ref 136–145)
TRIGL SERPL-MCNC: 106 MG/DL (ref ?–150)
TSH SERPL DL<=0.05 MIU/L-ACNC: 0.33 UIU/ML (ref 0.36–3.74)
VLDLC SERPL CALC-MCNC: 21.2 MG/DL
WBC # BLD AUTO: 5.5 K/UL (ref 3.6–11)

## 2021-07-23 ENCOUNTER — HOSPITAL ENCOUNTER (INPATIENT)
Age: 61
LOS: 3 days | Discharge: HOME | DRG: 813 | End: 2021-07-26
Attending: EMERGENCY MEDICINE | Admitting: FAMILY MEDICINE
Payer: COMMERCIAL

## 2021-07-23 DIAGNOSIS — D64.9 NORMOCYTIC ANEMIA: ICD-10-CM

## 2021-07-23 DIAGNOSIS — D69.3 ACUTE ITP (HCC): ICD-10-CM

## 2021-07-23 DIAGNOSIS — K92.1 MELENA: ICD-10-CM

## 2021-07-23 DIAGNOSIS — D69.6 SEVERE THROMBOCYTOPENIA (HCC): ICD-10-CM

## 2021-07-23 DIAGNOSIS — R23.3 PETECHIAE: ICD-10-CM

## 2021-07-23 DIAGNOSIS — Z79.899 HIGH RISK MEDICATION USE: ICD-10-CM

## 2021-07-23 DIAGNOSIS — G44.221 CHRONIC TENSION-TYPE HEADACHE, INTRACTABLE: Primary | ICD-10-CM

## 2021-07-23 PROBLEM — R77.8 LOW SERUM HAPTOGLOBIN: Status: ACTIVE | Noted: 2021-07-23

## 2021-07-23 LAB
ABO + RH BLD: NORMAL
ALBUMIN SERPL-MCNC: 4.2 G/DL (ref 3.5–5)
ALBUMIN/GLOB SERPL: 1.2 {RATIO} (ref 1.1–2.2)
ALP SERPL-CCNC: 105 U/L (ref 45–117)
ALT SERPL-CCNC: 24 U/L (ref 12–78)
ANION GAP SERPL CALC-SCNC: 3 MMOL/L (ref 5–15)
APTT PPP: 29.4 SEC (ref 22.1–31)
AST SERPL-CCNC: 30 U/L (ref 15–37)
BASOPHILS # BLD: 0.1 K/UL (ref 0–0.1)
BASOPHILS NFR BLD: 1 % (ref 0–1)
BILIRUB SERPL-MCNC: 0.7 MG/DL (ref 0.2–1)
BLOOD GROUP ANTIBODIES SERPL: NORMAL
BUN SERPL-MCNC: 17 MG/DL (ref 6–20)
BUN/CREAT SERPL: 25 (ref 12–20)
CALCIUM SERPL-MCNC: 9.1 MG/DL (ref 8.5–10.1)
CHLORIDE SERPL-SCNC: 106 MMOL/L (ref 97–108)
CO2 SERPL-SCNC: 31 MMOL/L (ref 21–32)
COMMENT, HOLDF: NORMAL
CREAT SERPL-MCNC: 0.68 MG/DL (ref 0.55–1.02)
DIFFERENTIAL METHOD BLD: ABNORMAL
EOSINOPHIL # BLD: 0.2 K/UL (ref 0–0.4)
EOSINOPHIL NFR BLD: 3 % (ref 0–7)
ERYTHROCYTE [DISTWIDTH] IN BLOOD BY AUTOMATED COUNT: 13.5 % (ref 11.5–14.5)
FIBRINOGEN PPP-MCNC: 284 MG/DL (ref 200–475)
FOLATE SERPL-MCNC: 55.1 NG/ML (ref 5–21)
GLOBULIN SER CALC-MCNC: 3.5 G/DL (ref 2–4)
GLUCOSE SERPL-MCNC: 98 MG/DL (ref 65–100)
HAPTOGLOB SERPL-MCNC: <8 MG/DL (ref 30–200)
HBV SURFACE AB SER QL: NONREACTIVE
HBV SURFACE AB SER-ACNC: <3.1 MIU/ML
HBV SURFACE AG SER QL: <0.1 INDEX
HBV SURFACE AG SER QL: NEGATIVE
HCT VFR BLD AUTO: 37 % (ref 35–47)
HEMOCCULT STL QL: POSITIVE
HGB BLD-MCNC: 11.9 G/DL (ref 11.5–16)
HIV 1+2 AB+HIV1 P24 AG SERPL QL IA: NONREACTIVE
HIV12 RESULT COMMENT, HHIVC: NORMAL
IMM GRANULOCYTES # BLD AUTO: 0 K/UL (ref 0–0.04)
IMM GRANULOCYTES NFR BLD AUTO: 0 % (ref 0–0.5)
INR PPP: 1.1 (ref 0.9–1.1)
LDH SERPL L TO P-CCNC: 229 U/L (ref 81–246)
LYMPHOCYTES # BLD: 1.5 K/UL (ref 0.8–3.5)
LYMPHOCYTES NFR BLD: 29 % (ref 12–49)
MCH RBC QN AUTO: 30.2 PG (ref 26–34)
MCHC RBC AUTO-ENTMCNC: 32.2 G/DL (ref 30–36.5)
MCV RBC AUTO: 93.9 FL (ref 80–99)
MONOCYTES # BLD: 0.5 K/UL (ref 0–1)
MONOCYTES NFR BLD: 10 % (ref 5–13)
NEUTS SEG # BLD: 2.7 K/UL (ref 1.8–8)
NEUTS SEG NFR BLD: 57 % (ref 32–75)
NRBC # BLD: 0 K/UL (ref 0–0.01)
NRBC BLD-RTO: 0 PER 100 WBC
PATH REV BLD -IMP: NORMAL
PLATELET # BLD AUTO: <3 K/UL (ref 150–400)
PMV BLD AUTO: ABNORMAL FL (ref 8.9–12.9)
POTASSIUM SERPL-SCNC: 3.7 MMOL/L (ref 3.5–5.1)
PROT SERPL-MCNC: 7.7 G/DL (ref 6.4–8.2)
PROTHROMBIN TIME: 11 SEC (ref 9–11.1)
RBC # BLD AUTO: 3.94 M/UL (ref 3.8–5.2)
RBC MORPH BLD: ABNORMAL
SAMPLES BEING HELD,HOLD: NORMAL
SODIUM SERPL-SCNC: 140 MMOL/L (ref 136–145)
SPECIMEN EXP DATE BLD: NORMAL
THERAPEUTIC RANGE,PTTT: NORMAL SECS (ref 58–77)
VIT B12 SERPL-MCNC: 685 PG/ML (ref 193–986)
WBC # BLD AUTO: 5 K/UL (ref 3.6–11)

## 2021-07-23 PROCEDURE — 86803 HEPATITIS C AB TEST: CPT

## 2021-07-23 PROCEDURE — 36415 COLL VENOUS BLD VENIPUNCTURE: CPT

## 2021-07-23 PROCEDURE — 82746 ASSAY OF FOLIC ACID SERUM: CPT

## 2021-07-23 PROCEDURE — 82607 VITAMIN B-12: CPT

## 2021-07-23 PROCEDURE — 74011250636 HC RX REV CODE- 250/636: Performed by: NURSE PRACTITIONER

## 2021-07-23 PROCEDURE — 74011250637 HC RX REV CODE- 250/637: Performed by: STUDENT IN AN ORGANIZED HEALTH CARE EDUCATION/TRAINING PROGRAM

## 2021-07-23 PROCEDURE — 86704 HEP B CORE ANTIBODY TOTAL: CPT

## 2021-07-23 PROCEDURE — 85025 COMPLETE CBC W/AUTO DIFF WBC: CPT

## 2021-07-23 PROCEDURE — 99254 IP/OBS CNSLTJ NEW/EST MOD 60: CPT | Performed by: INTERNAL MEDICINE

## 2021-07-23 PROCEDURE — P9100 PATHOGEN TEST FOR PLATELETS: HCPCS

## 2021-07-23 PROCEDURE — 85730 THROMBOPLASTIN TIME PARTIAL: CPT

## 2021-07-23 PROCEDURE — 93005 ELECTROCARDIOGRAM TRACING: CPT

## 2021-07-23 PROCEDURE — 86757 RICKETTSIA ANTIBODY: CPT

## 2021-07-23 PROCEDURE — 36430 TRANSFUSION BLD/BLD COMPNT: CPT

## 2021-07-23 PROCEDURE — 86666 EHRLICHIA ANTIBODY: CPT

## 2021-07-23 PROCEDURE — 99283 EMERGENCY DEPT VISIT LOW MDM: CPT

## 2021-07-23 PROCEDURE — 85384 FIBRINOGEN ACTIVITY: CPT

## 2021-07-23 PROCEDURE — 87389 HIV-1 AG W/HIV-1&-2 AB AG IA: CPT

## 2021-07-23 PROCEDURE — 65270000029 HC RM PRIVATE

## 2021-07-23 PROCEDURE — 83615 LACTATE (LD) (LDH) ENZYME: CPT

## 2021-07-23 PROCEDURE — 87340 HEPATITIS B SURFACE AG IA: CPT

## 2021-07-23 PROCEDURE — 86618 LYME DISEASE ANTIBODY: CPT

## 2021-07-23 PROCEDURE — 82272 OCCULT BLD FECES 1-3 TESTS: CPT

## 2021-07-23 PROCEDURE — 99222 1ST HOSP IP/OBS MODERATE 55: CPT | Performed by: FAMILY MEDICINE

## 2021-07-23 PROCEDURE — 80053 COMPREHEN METABOLIC PANEL: CPT

## 2021-07-23 PROCEDURE — 83010 ASSAY OF HAPTOGLOBIN QUANT: CPT

## 2021-07-23 PROCEDURE — 85610 PROTHROMBIN TIME: CPT

## 2021-07-23 PROCEDURE — P9035 PLATELET PHERES LEUKOREDUCED: HCPCS

## 2021-07-23 PROCEDURE — 74011250637 HC RX REV CODE- 250/637: Performed by: NURSE PRACTITIONER

## 2021-07-23 PROCEDURE — 30233N1 TRANSFUSION OF NONAUTOLOGOUS RED BLOOD CELLS INTO PERIPHERAL VEIN, PERCUTANEOUS APPROACH: ICD-10-PCS | Performed by: FAMILY MEDICINE

## 2021-07-23 PROCEDURE — 74011250636 HC RX REV CODE- 250/636

## 2021-07-23 PROCEDURE — 86901 BLOOD TYPING SEROLOGIC RH(D): CPT

## 2021-07-23 PROCEDURE — 86706 HEP B SURFACE ANTIBODY: CPT

## 2021-07-23 RX ORDER — DIPHENHYDRAMINE HCL 25 MG
50 CAPSULE ORAL
Status: DISCONTINUED | OUTPATIENT
Start: 2021-07-23 | End: 2021-07-26 | Stop reason: HOSPADM

## 2021-07-23 RX ORDER — DIPHENHYDRAMINE HCL 25 MG
25 CAPSULE ORAL ONCE
Status: ACTIVE | OUTPATIENT
Start: 2021-07-23 | End: 2021-07-24

## 2021-07-23 RX ORDER — SODIUM CHLORIDE 0.9 % (FLUSH) 0.9 %
5-40 SYRINGE (ML) INJECTION EVERY 8 HOURS
Status: DISCONTINUED | OUTPATIENT
Start: 2021-07-23 | End: 2021-07-26 | Stop reason: HOSPADM

## 2021-07-23 RX ORDER — SODIUM CHLORIDE 9 MG/ML
100 INJECTION, SOLUTION INTRAVENOUS CONTINUOUS
Status: DISCONTINUED | OUTPATIENT
Start: 2021-07-23 | End: 2021-07-25

## 2021-07-23 RX ORDER — PANTOPRAZOLE SODIUM 40 MG/1
40 TABLET, DELAYED RELEASE ORAL 2 TIMES DAILY
Status: DISCONTINUED | OUTPATIENT
Start: 2021-07-23 | End: 2021-07-26 | Stop reason: HOSPADM

## 2021-07-23 RX ORDER — SODIUM CHLORIDE 9 MG/ML
250 INJECTION, SOLUTION INTRAVENOUS AS NEEDED
Status: DISCONTINUED | OUTPATIENT
Start: 2021-07-23 | End: 2021-07-26 | Stop reason: HOSPADM

## 2021-07-23 RX ORDER — CALCIUM CARB/MAGNESIUM CARB 311-232MG
10 TABLET ORAL
Status: DISCONTINUED | OUTPATIENT
Start: 2021-07-23 | End: 2021-07-26 | Stop reason: HOSPADM

## 2021-07-23 RX ORDER — ACETAMINOPHEN 650 MG/1
650 SUPPOSITORY RECTAL
Status: DISCONTINUED | OUTPATIENT
Start: 2021-07-23 | End: 2021-07-26 | Stop reason: HOSPADM

## 2021-07-23 RX ORDER — TIZANIDINE 4 MG/1
4 TABLET ORAL
Status: DISCONTINUED | OUTPATIENT
Start: 2021-07-23 | End: 2021-07-23

## 2021-07-23 RX ORDER — DIPHENHYDRAMINE HCL 25 MG
25 CAPSULE ORAL
Status: DISCONTINUED | OUTPATIENT
Start: 2021-07-23 | End: 2021-07-23

## 2021-07-23 RX ORDER — TIZANIDINE 2 MG/1
4 TABLET ORAL
Status: DISCONTINUED | OUTPATIENT
Start: 2021-07-23 | End: 2021-07-26 | Stop reason: HOSPADM

## 2021-07-23 RX ORDER — ACETAMINOPHEN 325 MG/1
650 TABLET ORAL
Status: DISCONTINUED | OUTPATIENT
Start: 2021-07-23 | End: 2021-07-26 | Stop reason: HOSPADM

## 2021-07-23 RX ORDER — TIZANIDINE 4 MG/1
4 TABLET ORAL
COMMUNITY

## 2021-07-23 RX ORDER — POLYETHYLENE GLYCOL 3350 17 G/17G
17 POWDER, FOR SOLUTION ORAL DAILY PRN
Status: DISCONTINUED | OUTPATIENT
Start: 2021-07-23 | End: 2021-07-26 | Stop reason: HOSPADM

## 2021-07-23 RX ORDER — SODIUM CHLORIDE 0.9 % (FLUSH) 0.9 %
5-40 SYRINGE (ML) INJECTION AS NEEDED
Status: DISCONTINUED | OUTPATIENT
Start: 2021-07-23 | End: 2021-07-26 | Stop reason: HOSPADM

## 2021-07-23 RX ADMIN — Medication 5 ML: at 14:50

## 2021-07-23 RX ADMIN — SODIUM CHLORIDE 100 ML/HR: 9 INJECTION, SOLUTION INTRAVENOUS at 14:52

## 2021-07-23 RX ADMIN — DIPHENHYDRAMINE HYDROCHLORIDE 50 MG: 25 CAPSULE ORAL at 22:19

## 2021-07-23 RX ADMIN — Medication 10 ML: at 20:00

## 2021-07-23 RX ADMIN — PANTOPRAZOLE SODIUM 40 MG: 40 TABLET, DELAYED RELEASE ORAL at 17:31

## 2021-07-23 RX ADMIN — DEXAMETHASONE 40 MG: 4 TABLET ORAL at 14:49

## 2021-07-23 NOTE — PROGRESS NOTES
Heme/Onc    Consult noted  for ITP    Isis Kwon presented to the ED on 7/23/21 with concerns of bruising and petechiae. ED labs plt < 3K. Pt reports on Tues petechiae and bruising noted on extremities and chest area. This am noted stool was dark with some red when wiping. Denies any other signs of bleeding including nose bleeds or HA. Only new medication is Zanaflex; started in March; stopped a few days ago when read package insert that noted could cause bleeding problems. Reports has been STEPHEN positive in the past; sought rheumatology alternative treatments with acupuncture, nutritional supplements and chiropractic. EGD/Colon: denies  Occupation: nurse    Has not had the COVID vaccine.  at bedside. Plan  Thrombocytopenia: peripheral smear pending: additional labs added to further eval.   Concerning for ITP  With plt count < 3K and signs of bleeding with transfuse with 1 unit plts and consult GI. Will initiate Dex 40 mg po daily x 4 days with GI prophylactic medication added. I have discussed with [facility]  the rationale for blood component transfusion; its benefits in treating or preventing fatigue, organ damage, or death; and its risk which includes mild transfusion reactions, rare risk of blood borne infection, or more serious but rare reactions. I have discussed the alternatives to transfusion, including the risk and consequences of not receiving transfusion. Mliss Senegal  had an opportunity to ask questions and had agreed to proceed with transfusion of blood components.     Full consult note to follow by Dr Karri Boyce, NP

## 2021-07-23 NOTE — ED NOTES
TRANSFER - OUT REPORT:    Verbal report given to Ramincameron Jeffrey (name) on [address]  being transferred to 5th floor (unit) for routine progression of care       Report consisted of patients Situation, Background, Assessment and   Recommendations(SBAR). Information from the following report(s) SBAR, Kardex, ED Summary, Intake/Output, MAR and Recent Results was reviewed with the receiving nurse. Lines:   Peripheral IV 07/23/21 Right Antecubital (Active)   Site Assessment Clean, dry, & intact 07/23/21 1117   Phlebitis Assessment 0 07/23/21 1117   Infiltration Assessment 0 07/23/21 1117   Dressing Status Clean, dry, & intact 07/23/21 1117   Hub Color/Line Status Pink;Flushed;Patent 07/23/21 1117   Action Taken Blood drawn 07/23/21 1117       Peripheral IV 07/23/21 Left Antecubital (Active)   Site Assessment Clean, dry, & intact 07/23/21 1354   Phlebitis Assessment 0 07/23/21 1354   Infiltration Assessment 0 07/23/21 1354   Dressing Status Clean, dry, & intact 07/23/21 1354   Hub Color/Line Status Pink 07/23/21 1354   Action Taken Blood drawn 07/23/21 1354        Opportunity for questions and clarification was provided.       Patient transported with:   [employer]

## 2021-07-23 NOTE — PROGRESS NOTES
[facility]: MED RECONCILIATION    Comments/Recommendations:   Patient alert and oriented for medication reconciliation, has not taken any medications at home since 7/20/21 due to acute status. Confirmed preferred pharmacy as [facility]. Medications added:     Hepacaps    Medications removed:    none    Medications adjusted:    Tizanidine 4mg QHS adjusted from 4mg TID PRN    Information obtained from: patient, Rx query    Allergies: Doxycycline and Sulfa (sulfonamide antibiotics)    Prior to Admission Medications:     Medication Documentation Review Audit       Reviewed by Lorena Balbuena (Pharmacist) on 07/23/21 at 1255      Medication Sig Documenting Provider Last Dose Status Taking?   ascorbic acid, vitamin C, (VITAMIN C) 500 mg tablet Take 1,000 mg by mouth two (2) times a day. Provider, Historical 7/20/2021 Active Yes   B.infantis-B.ani-B.long-B.bifi (PROBIOTIC 4X) 10-15 mg TbEC Take 1 Caplet by mouth two (2) times a day. Provider, Historical 7/20/2021 Active Yes   calcium/mag/vitamin D2/Zn/min (ADAM-MAG ZINC II PO) Take 1 Tablet by mouth two (2) times a day. Provider, Historical 7/20/2021 Active Yes   cholecalciferol, vitamin D3, (Vitamin D3) 50 mcg (2,000 unit) tab Take 2,000 Units by mouth two (2) times a day. Provider, Historical 7/20/2021 Active Yes   glucosamine-chondroitin (ARTHX) 500-400 mg cap Take 1 Capsule by mouth two (2) times a day. Provider, Historical 7/20/2021 Active Yes   multivitamin (ONE A DAY) tablet Take 1 Tab by mouth daily. Provider, Historical 7/20/2021 Active Yes   Omega-3 Fatty Acids 60- mg cpDR Take 1 Caplet by mouth two (2) times a day. Provider, Historical 7/20/2021 Active Yes   tiZANidine (Zanaflex) 4 mg tablet Take 4 mg by mouth nightly. Provider, Historical 7/20/2021 Active Yes   turmeric 400 mg cap Take 1 Caplet by mouth two (2) times a day.  Provider, Historical 7/20/2021 Active Yes                    Thank you,   Lornea Rodriguez, PharmD, BCPS Contact: [phone]

## 2021-07-23 NOTE — CONSULTS
Ina Ivan. Aurea Oden MD  [phone] office  [phone] voicemail   Gastroenterology Consultation Note      Admit Date: 7/23/2021  Consult Date: 7/23/2021   I greatly appreciate your asking me to see Najma Delgadillo, thank you very much for the opportunity to participate in her care. Narrative Assessment and Plan   · Thrombocytopenia  · Petechiae  · Bruising  · Oral bleeding  · Blood in stool    I suspect the same oozing of blood we're seeing from the oral mucosa and skin is present in the intestine. I don't see any evidence of acute hemodynamically significant GI blood loss. I have recommended that after her platelet count improves we arrange EGD and colonoscopy. She has indicated an unwillingness to accomplish those procedures but stated she would think about it. I will have my office offer those procedures to her as an outpatient. If she remains hospitalized next week and desires to proceed please re-consult. GI will sign off for now. Subjective:     Chief Complaint: Blood in stool, in mouth, in skin. History of Present Illness: 60F with \"undifferentiated connective tissue disease\" presents with profound thrombocytopenia and concern for ITP. She has noted petechiae, bruising, and bleeding from gums and nasopharynx. Notes some blood in stool as well. Denies vomiting of blood or large volume GI blood loss. PCP:  Alivia Dillard NP    Past Medical History:   Diagnosis Date    Arthritis     Degenerative disc disease     Fibromyalgia         Past Surgical History:   Procedure Laterality Date    HX HEENT      HX ORTHOPAEDIC         Social History     Tobacco Use    Smoking status: Never Smoker    Smokeless tobacco: Never Used   Substance Use Topics    Alcohol use:  Yes     Alcohol/week: 1.7 standard drinks     Types: 2 Glasses of wine per week        Family History   Problem Relation Age of Onset    Parkinsonism Father         Allergies Allergen Reactions    Doxycycline Other (comments)     headache    Sulfa (Sulfonamide Antibiotics) Other (comments)     headache            Home Medications:  Prior to Admission Medications   Prescriptions Last Dose Informant Patient Reported? Taking? B.infantis-B.ani-B.long-B.bifi (PROBIOTIC 4X) 10-15 mg TbEC 7/20/2021 Self Yes Yes   Sig: Take 1 Caplet by mouth two (2) times a day. OTHER,NON-FORMULARY, 7/20/2021 Self Yes Yes   Sig: Hepacaps: Take one capsule by mouth twice daily   Omega-3 Fatty Acids 60- mg cpDR 7/20/2021 Self Yes Yes   Sig: Take 1 Caplet by mouth two (2) times a day. ascorbic acid, vitamin C, (VITAMIN C) 500 mg tablet 7/20/2021 Self Yes Yes   Sig: Take 1,000 mg by mouth two (2) times a day. calcium/mag/vitamin D2/Zn/min (ADAM-MAG ZINC II PO) 7/20/2021 Self Yes Yes   Sig: Take 1 Tablet by mouth two (2) times a day. cholecalciferol, vitamin D3, (Vitamin D3) 50 mcg (2,000 unit) tab 7/20/2021 Self Yes Yes   Sig: Take 2,000 Units by mouth two (2) times a day. glucosamine-chondroitin (ARTHX) 500-400 mg cap 7/20/2021 Self Yes Yes   Sig: Take 1 Capsule by mouth two (2) times a day. multivitamin (ONE A DAY) tablet 7/20/2021 Self Yes Yes   Sig: Take 1 Tab by mouth daily. tiZANidine (Zanaflex) 4 mg tablet 7/20/2021 Self Yes Yes   Sig: Take 4 mg by mouth nightly. turmeric 400 mg cap 7/20/2021 Self Yes Yes   Sig: Take 1 Caplet by mouth two (2) times a day.       Facility-Administered Medications: None       Hospital Medications:  Current Facility-Administered Medications   Medication Dose Route Frequency    0.9% sodium chloride infusion 250 mL  250 mL IntraVENous PRN    dexAMETHasone (DECADRON) tablet 40 mg  40 mg Oral DAILY    pantoprazole (PROTONIX) tablet 40 mg  40 mg Oral BID    sodium chloride (NS) flush 5-40 mL  5-40 mL IntraVENous Q8H    sodium chloride (NS) flush 5-40 mL  5-40 mL IntraVENous PRN    acetaminophen (TYLENOL) tablet 650 mg  650 mg Oral Q6H PRN    Or    acetaminophen (TYLENOL) suppository 650 mg  650 mg Rectal Q6H PRN    polyethylene glycol (MIRALAX) packet 17 g  17 g Oral DAILY PRN    0.9% sodium chloride infusion  100 mL/hr IntraVENous CONTINUOUS    tiZANidine (ZANAFLEX) tablet 4 mg  4 mg Oral QHS PRN    melatonin (rapid dissolve) tablet 10 mg  10 mg Oral QHS PRN       Review of Systems: Admission ROS by Fam Chou MD from 7/23/2021 were reviewed with the patient and changes (other than per HPI) include: none      Objective:     Physical Exam:  Visit Vitals  BP (!) 108/57 (BP 1 Location: Left upper arm, BP Patient Position: At rest)   Pulse (!) 56   Temp 98.8 °F (37.1 °C)   Resp 17   Ht 5' 3\" (1.6 m)   Wt 61.2 kg (135 lb)   SpO2 93%   BMI 23.91 kg/m²     SpO2 Readings from Last 6 Encounters:   07/23/21 93%   03/22/21 98%   03/16/20 98%   10/22/18 99%   05/23/17 99%   03/02/17 100%        No intake or output data in the 24 hours ending 07/23/21 1718       Laboratory:    Recent Results (from the past 24 hour(s))   CBC WITH AUTOMATED DIFF    Collection Time: 07/23/21 11:14 AM   Result Value Ref Range    WBC 5.0 3.6 - 11.0 K/uL    RBC 3.94 3.80 - 5.20 M/uL    HGB 11.9 11.5 - 16.0 g/dL    HCT 37.0 35.0 - 47.0 %    MCV 93.9 80.0 - 99.0 FL    MCH 30.2 26.0 - 34.0 PG    MCHC 32.2 30.0 - 36.5 g/dL    RDW 13.5 11.5 - 14.5 %    PLATELET <3 (LL) 652 - 400 K/uL    MPV ABNORMAL 8.9 - 12.9 FL    NRBC 0.0 0  WBC    ABSOLUTE NRBC 0.00 0.00 - 0.01 K/uL    NEUTROPHILS 57 32 - 75 %    LYMPHOCYTES 29 12 - 49 %    MONOCYTES 10 5 - 13 %    EOSINOPHILS 3 0 - 7 %    BASOPHILS 1 0 - 1 %    IMMATURE GRANULOCYTES 0 0.0 - 0.5 %    ABS. NEUTROPHILS 2.7 1.8 - 8.0 K/UL    ABS. LYMPHOCYTES 1.5 0.8 - 3.5 K/UL    ABS. MONOCYTES 0.5 0.0 - 1.0 K/UL    ABS. EOSINOPHILS 0.2 0.0 - 0.4 K/UL    ABS. BASOPHILS 0.1 0.0 - 0.1 K/UL    ABS. IMM.  GRANS. 0.0 0.00 - 0.04 K/UL    DF SMEAR SCANNED      RBC COMMENTS NORMOCYTIC, NORMOCHROMIC     METABOLIC PANEL, COMPREHENSIVE    Collection Time: 07/23/21 11:14 AM   Result Value Ref Range    Sodium 140 136 - 145 mmol/L    Potassium 3.7 3.5 - 5.1 mmol/L    Chloride 106 97 - 108 mmol/L    CO2 31 21 - 32 mmol/L    Anion gap 3 (L) 5 - 15 mmol/L    Glucose 98 65 - 100 mg/dL    BUN 17 6 - 20 MG/DL    Creatinine 0.68 0.55 - 1.02 MG/DL    BUN/Creatinine ratio 25 (H) 12 - 20      GFR est AA >60 >60 ml/min/1.73m2    GFR est non-AA >60 >60 ml/min/1.73m2    Calcium 9.1 8.5 - 10.1 MG/DL    Bilirubin, total 0.7 0.2 - 1.0 MG/DL    ALT (SGPT) 24 12 - 78 U/L    AST (SGOT) 30 15 - 37 U/L    Alk. phosphatase 105 45 - 117 U/L    Protein, total 7.7 6.4 - 8.2 g/dL    Albumin 4.2 3.5 - 5.0 g/dL    Globulin 3.5 2.0 - 4.0 g/dL    A-G Ratio 1.2 1.1 - 2.2     SAMPLES BEING HELD    Collection Time: 07/23/21 11:14 AM   Result Value Ref Range    SAMPLES BEING HELD 1SST,1RED     COMMENT        Add-on orders for these samples will be processed based on acceptable specimen integrity and analyte stability, which may vary by analyte. PATHOLOGIST REVIEW    Collection Time: 07/23/21 11:14 AM   Result Value Ref Range    Pathologist review        Pathologic examination results can be viewed in [facility] Chart Review under the Pathology tab.    LD    Collection Time: 07/23/21 11:14 AM   Result Value Ref Range     81 - 246 U/L   VITAMIN B12    Collection Time: 07/23/21  1:52 PM   Result Value Ref Range    Vitamin B12 685 193 - 986 pg/mL   FOLATE    Collection Time: 07/23/21  1:52 PM   Result Value Ref Range    Folate 55.1 (H) 5.0 - 21.0 ng/mL   TYPE & SCREEN    Collection Time: 07/23/21  1:52 PM   Result Value Ref Range    Crossmatch Expiration 07/26/2021,2359     ABO/Rh(D) Ada Rere POSITIVE     Antibody screen NEG    HAPTOGLOBIN    Collection Time: 07/23/21  1:52 PM   Result Value Ref Range    Haptoglobin <8 (L) 30 - 200 mg/dL   PTT    Collection Time: 07/23/21  1:52 PM   Result Value Ref Range    aPTT 29.4 22.1 - 31.0 sec    aPTT, therapeutic range     58.0 - 77.0 SECS   PROTHROMBIN TIME + INR Collection Time: 07/23/21  1:52 PM   Result Value Ref Range    INR 1.1 0.9 - 1.1      Prothrombin time 11.0 9.0 - 11.1 sec   FIBRINOGEN    Collection Time: 07/23/21  1:52 PM   Result Value Ref Range    Fibrinogen 284 200 - 475 mg/dL   EKG, 12 LEAD, INITIAL    Collection Time: 07/23/21  4:31 PM   Result Value Ref Range    Ventricular Rate 63 BPM    Atrial Rate 63 BPM    P-R Interval 184 ms    QRS Duration 78 ms    Q-T Interval 418 ms    QTC Calculation (Bezet) 427 ms    Calculated P Axis 59 degrees    Calculated R Axis 23 degrees    Calculated T Axis 54 degrees    Diagnosis       Normal sinus rhythm  Normal ECG  No previous ECGs available           Assessment/Plan:     Active Problems:    * No active hospital problems. *       See above narrative for full detail.

## 2021-07-23 NOTE — CONSULTS
[facility] [place] at [address], [address] [facility]  Dora November: [phone]  F: [phone] Patient ID  Name: Wing Adams  YOB: 1960  MRN: [id number]  Referring Provider:   No referring provider defined for this encounter. Primary Care Provider:   Christina Pan NP       HEMATOLOGY/MEDICAL ONCOLOGY CONSULTATION NOTE   Date of Visit: 07/23/21  Reason for Visit:     Chief Complaint   Patient presents with    Bleeding/Bruising     Subjective:   Wing Adams is a 64 y.o. F who presents as an inpatient consultation for SEVERE THROMBOCYTOPENIA. Patient reports that she is dealing with dark stools today and petechiae with the later developing over the past 2-3 days. She reports melenic stools with onset in the past.  She reports that she had an idea she might have low platelets as she works as a nurse in the healthcare field. She sought care today after the onset of her dark stools that have resolved for now. She denies any nosebleeds, hemoptysis, vaginal bleeding. She denies any active pain complaints although has a history of fibromyalgia. She denies any lymph node enlargement or tenderness. She reports adequate oral intake of food and hydration. Patient denies any gross bloody stools. She denies any bowel or bladder problems otherwise. -Denies any prior hx of thrombocytopenia. -Denies any recent antibiotics or recent new medications (newest Zanaflex about 4 months ago). Has been taking herbal medications for years (dandelions, turmeric for examples). -no reported recent infections.  -has noticed some bruising in her mouth where she bit her cheeks. -Reportedly has a history of abnormal rheumatology labs which are not immediately available for review. -denies any recent hospitalization/denies any heparin exposure. Bleeding/Bruising  The history is provided by the patient. This is a new problem.  The current episode started 12 to 24 hours ago. The problem occurs daily. The problem has been resolved. Pertinent negatives include no chest pain, no abdominal pain, no headaches and no shortness of breath. Nothing aggravates the symptoms. Nothing relieves the symptoms. She has tried nothing for the symptoms. Past Medical History:   Diagnosis Date    Arthritis     Degenerative disc disease     Fibromyalgia       Past Surgical History:   Procedure Laterality Date    HX HEENT      HX ORTHOPAEDIC        Social History     Tobacco Use    Smoking status: Never Smoker    Smokeless tobacco: Never Used   Substance Use Topics    Alcohol use: Yes     Alcohol/week: 1.7 standard drinks     Types: 2 Glasses of wine per week      Family History   Problem Relation Age of Onset    Parkinsonism Father      Current Facility-Administered Medications   Medication Dose Route Frequency    0.9% sodium chloride infusion 250 mL  250 mL IntraVENous PRN    dexAMETHasone (DECADRON) tablet 40 mg  40 mg Oral DAILY    pantoprazole (PROTONIX) tablet 40 mg  40 mg Oral BID    sodium chloride (NS) flush 5-40 mL  5-40 mL IntraVENous Q8H    sodium chloride (NS) flush 5-40 mL  5-40 mL IntraVENous PRN    acetaminophen (TYLENOL) tablet 650 mg  650 mg Oral Q6H PRN    Or    acetaminophen (TYLENOL) suppository 650 mg  650 mg Rectal Q6H PRN    polyethylene glycol (MIRALAX) packet 17 g  17 g Oral DAILY PRN    0.9% sodium chloride infusion  100 mL/hr IntraVENous CONTINUOUS    tiZANidine (ZANAFLEX) tablet 4 mg  4 mg Oral QHS PRN    melatonin (rapid dissolve) tablet 10 mg  10 mg Oral QHS PRN      Allergies   Allergen Reactions    Doxycycline Other (comments)     headache    Sulfa (Sulfonamide Antibiotics) Other (comments)     headache      Review of Systems   Constitutional: Negative for malaise/fatigue. HENT: Negative for nosebleeds and sore throat. Eyes: Positive for redness (reports some mild red areas of her conjunctivae. ). Respiratory: Negative for shortness of breath. Cardiovascular: Negative for chest pain and leg swelling. Gastrointestinal: Positive for melena. Negative for abdominal pain, blood in stool, diarrhea, heartburn, nausea and vomiting. Genitourinary: Negative for dysuria and hematuria. Musculoskeletal: Negative for joint pain and myalgias. Skin: Positive for rash. Negative for itching. Neurological: Negative for focal weakness and headaches. Objective:     Visit Vitals  BP (!) 108/57 (BP 1 Location: Left upper arm, BP Patient Position: At rest)   Pulse (!) 56   Temp 98.8 °F (37.1 °C)   Resp 17   Ht 5' 3\" (1.6 m)   Wt 135 lb (61.2 kg)   SpO2 93%   BMI 23.91 kg/m²     Physical Exam  Constitutional:       General: She is not in acute distress. Appearance: She is not ill-appearing, toxic-appearing or diaphoretic. HENT:      Head: Normocephalic and atraumatic. Nose: No rhinorrhea. Mouth/Throat:      Mouth: Mucous membranes are moist.      Pharynx: No oropharyngeal exudate. Comments: Right posterior buccal cheek with bruising. Eyes:      General: No scleral icterus. Pupils: Pupils are equal, round, and reactive to light. Comments: Mild redness in scant areas of bilateral subconjunctivae. Cardiovascular:      Heart sounds: Normal heart sounds. No friction rub. No gallop. Pulmonary:      Effort: Pulmonary effort is normal.      Breath sounds: Normal breath sounds. No wheezing or rales. Abdominal:      General: Bowel sounds are normal. There is no distension. Palpations: Abdomen is soft. Tenderness: There is no abdominal tenderness. There is no guarding. Musculoskeletal:      Right lower leg: No edema. Left lower leg: No edema. Skin:     Coloration: Skin is not jaundiced. Findings: Bruising and rash (petechial rash bilateral lower legs) present. Neurological:      General: No focal deficit present.       Mental Status: She is oriented to person, place, and time. Psychiatric:         Mood and Affect: Mood normal.         Behavior: Behavior normal.     Results:     Lab Results   Component Value Date/Time    WBC 5.0 2021 11:14 AM    HGB 11.9 2021 11:14 AM    HCT 37.0 2021 11:14 AM    PLATELET <3 (LL)  11:14 AM    MCV 93.9 2021 11:14 AM    ABS. NEUTROPHILS 2.7 2021 11:14 AM     Lab Results   Component Value Date/Time    Sodium 140 2021 11:14 AM    Potassium 3.7 2021 11:14 AM    Chloride 106 2021 11:14 AM    CO2 31 2021 11:14 AM    Glucose 98 2021 11:14 AM    BUN 17 2021 11:14 AM    Creatinine 0.68 2021 11:14 AM    GFR est AA >60 2021 11:14 AM    GFR est non-AA >60 2021 11:14 AM    Calcium 9.1 2021 11:14 AM     Lab Results   Component Value Date/Time    Bilirubin, total 0.7 2021 11:14 AM    ALT (SGPT) 24 2021 11:14 AM    Alk. phosphatase 105 2021 11:14 AM    Protein, total 7.7 2021 11:14 AM    Albumin 4.2 2021 11:14 AM    Globulin 3.5 2021 11:14 AM     I personally reviewed pertinent labs/results: ISOLATED SEVERE THROMBOCYTOPENIA; Total Bilirubin normal. No schistocytosis reported on Path Smear Review. Specimen #:[id number]   Patient ID: [id number]                        Account [de-identified]   /Gender: 1960 (Age: 60)/F            Location: ED([facility])     Collect: 2021    Rec'd: 2021      Reported: 2021     Physician(s):   KARLA DODD     PERIPHERAL SMEAR REVIEW       * * *CLINICAL HISTORY* * *     None provided.           ==========================================================================   * * *FINAL PATHOLOGIC DIAGNOSIS* * *     Peripheral blood, smear:        RBC's:          Normocytic normochromic RBC's; no schistocytes   identified.        WBC's:     Unremarkable white blood cells        Platelets:     Severe thrombocytopenia; no platelet clumps are seen.        [facility]/2021 *Electronically Signed Out By Lorenzo Skiff. Dewey Chiu M.D.*   I personally reviewed pertinent referral notes: Primary team referred patient for hematology evaluation. Assessment and Recommendations:      Diagnosis    Severe thrombocytopenia (HCC)   Discussed with patient that her presentation is most consistent with immune thrombocytopenia. However, we discussed that there is no clear serologic test to confirm ITP. We discussed that treatment is typically administered after other severe thrombocytopenia causes have been relatively excluded. Her total bilirubin is normal as well as LDH. Also, she is without anemia. With such severe thrombocytopenia with a platelet count less than 5 we have recommended platelet transfusion in the presence of her melenic stools but also due to the risk of spontaneous intracranial bleeding. Patient is without any neuro symptoms to warrant any CNS imaging at present though we reviewed alarm symptoms in case she were to develop.  We will start high-dose dexamethasone 4 mg p.o. daily x4 as standard therapy for ITP. No plans to start any IVIG at present although may be used if she does not improve on her thrombocytopenia.  She is without any other clear systemic complaint to me. We will check HIV, hepatitis virus serology, vitamin B12.  Cannot exclude any autoimmune etiology. One may seek records on her prior rheumatology history.  I discussed with patient that weight anticipate improvement in her platelet count in the next 1 to 2 days. If no improvement then there may be a consideration of IVIG employment or consideration of other causes. However, she seems to be presenting with a typical ITP presentation.  Melena   Consider GI consultation. However, with severe thrombocytopenia platelet transfusion and steroid therapy are at the immediate forefront of her care. If she seems to have active bleeding and reasonable to contact GI.       Low serum haptoglobin   Unclear meaning of low haptoglobin in the presence of a normal LDH as well as normal T bilirubin. Patient is without any known anemia at present. Unclear role of testing for any direct Angie test without any signs of hemolytic anemia.  This does not seem to be consistent with TTP with normal LDH and no schistocytosis as well as no anemia.  High risk medication use   Today, I counseled patient on the risks of systemic steroids. She provided verbal consent. We discussed that in the short-term she is at risk for glucose metabolism disturbance, acute weakness, risk of steroid-induced psychosis, section, clotting.  I recommended to her that she received GI prophylaxis in setting of her melena but also while on high-dose steroids since she is at potential risk for the development of stress ulcers. She should start Dexamethasone 40mg PO daily on Days 1-4. She will         need outpatient follow-up with our clinic at [facility] upon         improvement of her condition and hospital discharge. I will ask my partner Jerry Pacheco to follow patient throughout the weekend. I personally discussed the case and recommendations with Attending, Dr. Jp Cannon MD, of the [facility] Medicine Residency Hospitalist group.     Signed By:   Saige Tello MD

## 2021-07-23 NOTE — ED PROVIDER NOTES
Date of Service:  7/23/2021    Patient:  Latasha Hubbard    Chief Complaint:  Bleeding/Bruising       HPI:  Latasha Hubbard is a 64 y.o.  female who presents for evaluation of bleeding and bruising. Patient states that several days ago she noted some petechiae on her bilateral lower extremities. Now she describes petechiae, easy bruising, some conjunctival hemorrhage as well as some oral lesions and easy bleeding. Today she got she noticed some dark-colored stool with some bright red stool mixed in. She denies any complaints of chest pain shortness of breath abdominal pain nausea vomiting diarrhea headaches fevers chills. No recent illness. No history of the like. Patient does have autoimmune disease with a positive STEPHEN, previous positive for rheumatoid Sjogren's fibromyalgia and lupus. Patient otherwise denies other acute complaints           Past Medical History:   Diagnosis Date    Arthritis     Degenerative disc disease     Fibromyalgia        Past Surgical History:   Procedure Laterality Date    HX HEENT      HX ORTHOPAEDIC           Family History:   Problem Relation Age of Onset    Parkinsonism Father        Social History     Socioeconomic History    Marital status:      Spouse name: Not on file    Number of children: Not on file    Years of education: Not on file    Highest education level: Not on file   Occupational History    Not on file   Tobacco Use    Smoking status: Never Smoker    Smokeless tobacco: Never Used   Substance and Sexual Activity    Alcohol use:  Yes     Alcohol/week: 1.7 standard drinks     Types: 2 Glasses of wine per week    Drug use: No    Sexual activity: Not on file   Other Topics Concern    Not on file   Social History Narrative    Not on file     Social Determinants of Health     Financial Resource Strain:     Difficulty of Paying Living Expenses:    Food Insecurity:     Worried About Running Out of Food in the Last Year:     Ran Out of Food in the Last Year:    Transportation Needs:     Lack of Transportation (Medical):  Lack of Transportation (Non-Medical):    Physical Activity:     Days of Exercise per Week:     Minutes of Exercise per Session:    Stress:     Feeling of Stress :    Social Connections:     Frequency of Communication with Friends and Family:     Frequency of Social Gatherings with Friends and Family:     Attends [employer] Services:     Active Member of Clubs or Organizations:     Attends Club or Organization Meetings:     Marital Status:    Intimate Partner Violence:     Fear of Current or Ex-Partner:     Emotionally Abused:     Physically Abused:     Sexually Abused: ALLERGIES: Doxycycline and Sulfa (sulfonamide antibiotics)    Review of Systems   Constitutional: Negative for fever. HENT: Negative for hearing loss. Eyes: Negative for visual disturbance. Respiratory: Negative for shortness of breath. Cardiovascular: Negative for chest pain. Gastrointestinal: Negative for abdominal pain. Genitourinary: Negative for flank pain. Musculoskeletal: Negative for back pain. Skin: Negative for rash. Neurological: Negative for dizziness and light-headedness. Hematological: Bruises/bleeds easily. Psychiatric/Behavioral: Negative for confusion. Vitals:    07/23/21 1034   BP: 119/82   Pulse: 97   Resp: 16   Temp: 97.3 °F (36.3 °C)   SpO2: 95%   Weight: 61.2 kg (135 lb)   Height: 5' 3\" (1.6 m)            Physical Exam  Vitals and nursing note reviewed. Constitutional:       Appearance: Normal appearance. HENT:      Head: Normocephalic and atraumatic. Nose: Nose normal.      Mouth/Throat:      Mouth: Mucous membranes are moist.      Comments: Several blood blisters  Eyes:      Extraocular Movements: Extraocular movements intact. Pupils: Pupils are equal, round, and reactive to light.       Comments: Conjunctival hemorrhage on both eyes   Cardiovascular:      Rate and Rhythm: Normal rate. Heart sounds: No murmur heard. Pulmonary:      Effort: Pulmonary effort is normal. No respiratory distress. Abdominal:      General: Abdomen is flat. Tenderness: There is no abdominal tenderness. Musculoskeletal:         General: Normal range of motion. Skin:     General: Skin is warm. Capillary Refill: Capillary refill takes less than 2 seconds. Comments: Petechia and bruising to b/l lower extremities, bruising to upper exremities   Neurological:      Mental Status: She is alert and oriented to person, place, and time. Psychiatric:         Mood and Affect: Mood normal.          [facility]  ED Course as of Jul 23 1204 Fri Jul 23, 2021   1147 PLATELET(!!): <3 [GG]      ED Course User Index  [GG] Linda Grant DO     VITAL SIGNS:  Patient Vitals for the past 4 hrs:   Temp Pulse Resp BP SpO2   07/23/21 1034 97.3 °F (36.3 °C) 97 16 119/82 95 %         LABS:  Recent Results (from the past 6 hour(s))   CBC WITH AUTOMATED DIFF    Collection Time: 07/23/21 11:14 AM   Result Value Ref Range    WBC 5.0 3.6 - 11.0 K/uL    RBC 3.94 3.80 - 5.20 M/uL    HGB 11.9 11.5 - 16.0 g/dL    HCT 37.0 35.0 - 47.0 %    MCV 93.9 80.0 - 99.0 FL    MCH 30.2 26.0 - 34.0 PG    MCHC 32.2 30.0 - 36.5 g/dL    RDW 13.5 11.5 - 14.5 %    PLATELET <3 (LL) 164 - 400 K/uL    MPV ABNORMAL 8.9 - 12.9 FL    NRBC 0.0 0  WBC    ABSOLUTE NRBC 0.00 0.00 - 0.01 K/uL    NEUTROPHILS 57 32 - 75 %    LYMPHOCYTES 29 12 - 49 %    MONOCYTES 10 5 - 13 %    EOSINOPHILS 3 0 - 7 %    BASOPHILS 1 0 - 1 %    IMMATURE GRANULOCYTES 0 0.0 - 0.5 %    ABS. NEUTROPHILS 2.7 1.8 - 8.0 K/UL    ABS. LYMPHOCYTES 1.5 0.8 - 3.5 K/UL    ABS. MONOCYTES 0.5 0.0 - 1.0 K/UL    ABS. EOSINOPHILS 0.2 0.0 - 0.4 K/UL    ABS. BASOPHILS 0.1 0.0 - 0.1 K/UL    ABS. IMM.  GRANS. 0.0 0.00 - 0.04 K/UL    DF SMEAR SCANNED      RBC COMMENTS NORMOCYTIC, NORMOCHROMIC     METABOLIC PANEL, COMPREHENSIVE    Collection Time: 07/23/21 11:14 AM Result Value Ref Range    Sodium 140 136 - 145 mmol/L    Potassium 3.7 3.5 - 5.1 mmol/L    Chloride 106 97 - 108 mmol/L    CO2 31 21 - 32 mmol/L    Anion gap 3 (L) 5 - 15 mmol/L    Glucose 98 65 - 100 mg/dL    BUN 17 6 - 20 MG/DL    Creatinine 0.68 0.55 - 1.02 MG/DL    BUN/Creatinine ratio 25 (H) 12 - 20      GFR est AA >60 >60 ml/min/1.73m2    GFR est non-AA >60 >60 ml/min/1.73m2    Calcium 9.1 8.5 - 10.1 MG/DL    Bilirubin, total 0.7 0.2 - 1.0 MG/DL    ALT (SGPT) 24 12 - 78 U/L    AST (SGOT) 30 15 - 37 U/L    Alk. phosphatase 105 45 - 117 U/L    Protein, total 7.7 6.4 - 8.2 g/dL    Albumin 4.2 3.5 - 5.0 g/dL    Globulin 3.5 2.0 - 4.0 g/dL    A-G Ratio 1.2 1.1 - 2.2     SAMPLES BEING HELD    Collection Time: 07/23/21 11:14 AM   Result Value Ref Range    SAMPLES BEING HELD 1SST,1RED     COMMENT        Add-on orders for these samples will be processed based on acceptable specimen integrity and analyte stability, which may vary by analyte. IMAGING:  No orders to display         Medications During Visit:  Medications - No data to display      DECISION MAKING:  Leobardo Gupta is a 64 y.o. female who comes in as above. Here, patient most likely has ITP. Critically low platelet count. I have consulted heme-onc who is seen the patient. Patient provided with steroids, platelet transfusion and admission for further work-up. Patient remained stable    Total critical care time spent exclusive of procedures:  40 minutes      IMPRESSION:  1. Acute ITP (HCC)    2. Petechiae        DISPOSITION:  Admitted        Procedures    Perfect Serve Consult for Admission  12:04 PM    ED Room Number: YC52/24  Patient Name and age:  Leobardo Gupta 64 y.o.  female  Working Diagnosis:   1.  Acute ITP (HCC)    2. Petechiae        COVID-19 Suspicion:  no  Sepsis present:  no  Reassessment needed: no  Code Status:  Full Code  Readmission: no  Isolation Requirements:  no  Recommended Level of Care: med/surg  Department:[facility] ED - [phone]  Other:  ITP w/ platelets less than 3. Petechia all over. Heme/Onc to see and provide steroid recommendations.

## 2021-07-23 NOTE — H&P
[address] [place] [address]   Office [phone]  Fax [phone]       Admission H&P     Name: Niki Wheeler MRN: [id number]  Sex: Female   YOB: 1960  Age: 64 y.o. PCP: Sanam Bills NP     Source of Information: patient, medical records    Chief complaint: Gerrianne Diones / Corey Keith / Coughing blood    History of Present Illness  Isis Aceves is a 64 y.o. female with PMHx of Fibromyalgia, non-differentiated connective tissue disease, multiple positive STEPHEN (rheumatoid, sjogrens, lupus)  and DDD who presents to the ED complaining of new onset bruising and bleeding. Bilateral petechia on lower extremities first noted three days ago. Spread to trunk today, and dark stool noted with some bright red blood which prompted her to present to the ER. She has also noticed sores in her mouth with some pink sputum noticed. Denies epistaxis and vomiting. Denies headache, fatigue, dizziness, chest pain. States this is the first time anything like this has happened. 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 Denies heparin exposure, recent infections, tonic water consumption, or quinine in supplements. COVID Questions: Not vaccinated due to autoimmune history. Experiencing any of the following symptoms: fever, chills, cough, SOB, diarrhea, URI symptoms. None  Any Sick contacts with fever, cough, diarrhea, SOB, URI symptoms. None  Traveled out of state or out of country. None  Lives with . Has been staying at home. No, travels locally for work    In the ED:  Vitals: Temp 97.3   /82   HR 97   RR 16   SatO2  95% on RA  Labs: Platelet count of 3 (baseline 200s), Hgb 11.9, WBC 5.0. BMP wnl.   Imaging: None  Treatment: None    EKG: Normal sinus    Patient Vitals for the past 12 hrs:   Temp Pulse Resp BP SpO2   07/23/21 1034 97.3 °F (36.3 °C) 97 16 119/82 95 %       Review of Systems  Review of Systems   Constitutional: Negative for chills, fatigue and fever. HENT: Positive for mouth sores. Negative for congestion and nosebleeds. Eyes: Negative for pain, discharge and visual disturbance. Respiratory: Negative for cough, chest tightness and wheezing. Cardiovascular: Negative for chest pain and palpitations. Gastrointestinal: Positive for abdominal pain and blood in stool. Negative for abdominal distention, diarrhea and rectal pain. Genitourinary: Negative for difficulty urinating, dysuria and hematuria. Musculoskeletal: Positive for arthralgias and myalgias. Skin: Positive for rash. Neurological: Negative for dizziness, weakness, light-headedness and headaches. Hematological: Bruises/bleeds easily. Psychiatric/Behavioral: Negative for confusion. The patient is not nervous/anxious. Home Medications   Prior to Admission medications    Medication Sig Start Date End Date Taking? Authorizing Provider   cholecalciferol, vitamin D3, (Vitamin D3) 50 mcg (2,000 unit) tab Take  by mouth. Take twice daily    Provider, Historical   tiZANidine (ZANAFLEX) 4 mg tablet Take 1 Tab by mouth three (3) times daily as needed for Muscle Spasm(s). 3/22/21   Kanwal Addison, SANDRA   calcium/mag/vitamin D2/Zn/min (ADAM-MAG ZINC II PO) Take  by mouth. Provider, Historical   Omega-3 Fatty Acids 60- mg cpDR Take  by mouth. Provider, Historical   multivitamin (ONE A DAY) tablet Take 1 Tab by mouth daily. Provider, Historical   TURMERIC PO Take  by mouth. Provider, Historical   B.infantis-B.ani-B.long-B.bifi (PROBIOTIC 4X) 10-15 mg TbEC Take  by mouth. Provider, Historical   ascorbic acid, vitamin C, (VITAMIN C) 500 mg tablet Take 1,000 mg by mouth. Provider, Historical   glucosamine-chondroitin (ARTHX) 500-400 mg cap Take 1 Cap by mouth daily.     Provider, Historical       Allergies  Allergies   Allergen Reactions    Doxycycline Other (comments)     headache    Sulfa (Sulfonamide Antibiotics) Other (comments) headache       Past Medical History  Past Medical History:   Diagnosis Date    Arthritis     Degenerative disc disease     Fibromyalgia        Previous Hospitalization(s)  Past Surgical History:   Procedure Laterality Date    HX HEENT      HX ORTHOPAEDIC         Family History  Family History   Problem Relation Age of Onset    Parkinsonism Father        Social History  Alcohol history: Yes, 1-2 on weekdays, 1-2 on weekends of -  Smoking history: None  Illicit drug history: Not at all  Living arrangement: patient lives with their spouse. Ambulates: Independently     Vital Signs  Visit Vitals  /82 (BP 1 Location: Right upper arm, BP Patient Position: Sitting)   Pulse 97   Temp 97.3 °F (36.3 °C)   Resp 16   Ht 5' 3\" (1.6 m)   Wt 135 lb (61.2 kg)   SpO2 95%   BMI 23.91 kg/m²       Physical Exam  General: No acute distress. Alert. Cooperative. Head: Normocephalic. Atraumatic. Few scattered petechia. Eyes:              Conjunctiva pink. Sclera white with couple ocular petechia . PERRL. Ears:              Hearing grossly intact. Nose:             Septum midline. Mucosa pink. No drainage. Throat: Mucosa pink. Moist mucous membranes with three sores on the cheeks and back of throat. No active bleeding seen. Palate movement equal bilaterally. Neck: Supple. Normal ROM. No stiffness. Respiratory: CTAB. No w/r/r/c.   Cardiovascular: RRR. Normal S1,S2. No m/r/g. Pulses 2+ throughout. GI: + bowel sounds. Mild tenderness to deep palpation over epigastric region. No rebound tenderness or guarding. Nondistended. Extremities: No LE edema. Distal pulses intact. Scattered small petechia bilaterally   Musculoskeletal: Full ROM in all extremities. Skin: Warm, dry. Scattered petechia and bruising throughout. Chidi Keller Neuro: CN II-XII grossly intact.         Laboratory Data  Recent Results (from the past 8 hour(s))   CBC WITH AUTOMATED DIFF    Collection Time: 07/23/21 11:14 AM   Result Value Ref Range    WBC 5.0 3.6 - 11.0 K/uL    RBC 3.94 3.80 - 5.20 M/uL    HGB 11.9 11.5 - 16.0 g/dL    HCT 37.0 35.0 - 47.0 %    MCV 93.9 80.0 - 99.0 FL    MCH 30.2 26.0 - 34.0 PG    MCHC 32.2 30.0 - 36.5 g/dL    RDW 13.5 11.5 - 14.5 %    PLATELET <3 (LL) 914 - 400 K/uL    MPV ABNORMAL 8.9 - 12.9 FL    NRBC 0.0 0  WBC    ABSOLUTE NRBC 0.00 0.00 - 0.01 K/uL    NEUTROPHILS 57 32 - 75 %    LYMPHOCYTES 29 12 - 49 %    MONOCYTES 10 5 - 13 %    EOSINOPHILS 3 0 - 7 %    BASOPHILS 1 0 - 1 %    IMMATURE GRANULOCYTES 0 0.0 - 0.5 %    ABS. NEUTROPHILS 2.7 1.8 - 8.0 K/UL    ABS. LYMPHOCYTES 1.5 0.8 - 3.5 K/UL    ABS. MONOCYTES 0.5 0.0 - 1.0 K/UL    ABS. EOSINOPHILS 0.2 0.0 - 0.4 K/UL    ABS. BASOPHILS 0.1 0.0 - 0.1 K/UL    ABS. IMM. GRANS. 0.0 0.00 - 0.04 K/UL    DF SMEAR SCANNED      RBC COMMENTS NORMOCYTIC, NORMOCHROMIC     METABOLIC PANEL, COMPREHENSIVE    Collection Time: 07/23/21 11:14 AM   Result Value Ref Range    Sodium 140 136 - 145 mmol/L    Potassium 3.7 3.5 - 5.1 mmol/L    Chloride 106 97 - 108 mmol/L    CO2 31 21 - 32 mmol/L    Anion gap 3 (L) 5 - 15 mmol/L    Glucose 98 65 - 100 mg/dL    BUN 17 6 - 20 MG/DL    Creatinine 0.68 0.55 - 1.02 MG/DL    BUN/Creatinine ratio 25 (H) 12 - 20      GFR est AA >60 >60 ml/min/1.73m2    GFR est non-AA >60 >60 ml/min/1.73m2    Calcium 9.1 8.5 - 10.1 MG/DL    Bilirubin, total 0.7 0.2 - 1.0 MG/DL    ALT (SGPT) 24 12 - 78 U/L    AST (SGOT) 30 15 - 37 U/L    Alk. phosphatase 105 45 - 117 U/L    Protein, total 7.7 6.4 - 8.2 g/dL    Albumin 4.2 3.5 - 5.0 g/dL    Globulin 3.5 2.0 - 4.0 g/dL    A-G Ratio 1.2 1.1 - 2.2     SAMPLES BEING HELD    Collection Time: 07/23/21 11:14 AM   Result Value Ref Range    SAMPLES BEING HELD 1SST,1RED     COMMENT        Add-on orders for these samples will be processed based on acceptable specimen integrity and analyte stability, which may vary by analyte.        Imaging  CXR Results  (Last 48 hours)    None        CT Results  (Last 48 hours)    None            Assessment and [address] [place] [address] is a 64 y.o. female with a PMHx of Fibromyalgia, DDD, and postivie STEPHEN for RA/Sjogrens/Lupus who is admitted for Immune Thrombocytopenia. Followed by Heme/Onc, s/p Platelet Transfusion (7/23). Immune Thrombocytopenia: Platelets POA of < 3. History of multiple positive STEPHEN (RA/sjogrens/lupus). Three days of increased bruising and petechia, dark stools. Heme/Onc and GI consulted. - Follow up Heme/Onc consult, appreciate recs   - Platelet transfusion 7/23   - Started on Decadron 40mg daily (Day 1 of 4)   - Follow up labs for HIV, Hep B, Hep C, LD. Peripheral smear. B12/Folate. - Follow up PT, PTT, Haptoglobin, Fibrinogen. - Follow up Tick studies (rickettsia, lyme, ehrlichiosis)  - Daily CMP / CBC    GI Bleed: History of dark stools x1 day and small amount of BRBPR in the setting of ITP. Patient does have history of hemorrhoids. Consulted GI.   - FOBT  - daily CBC  - f/u GI consult, appreciate recs. Plan for possible outpatient EGD and colonoscopy once platelet count resolves. Fibromyalgia: chronic, stable  - Continue home Tizanidine 4mg nightly for muscle spasms    Degenerative Disc Disease: Chronic, stable    Insomnia: chronic stable   - continue home Melatonin nightly    GERD: chronic, history of H. Pylori x5 years ago. No home treatment now. - Start Protonix 40mg BID    Non-differentiated Connective Tissue Disease: No current medications, history of Plaquenil use. Managed now with chiropractics and supplements  - Holding home supplements (Vit C, glucosamine chonroitin, Turmeric, Omega 3)      FEN/GI - Regular diet. -  Activity - Ambulate with assistance  DVT prophylaxis - None indicated at this time  GI prophylaxis - Protonix  Fall prophylaxis - Not indicated at this time. Disposition - Admit to Medical. Plan to d/c to Home. Consulting -  Code Status - DNR. Discussed with patient / caregivers.   Next of Kin Name and Contact - Norma Kidney,  Spouse - [phone]    Patient Marielle Flores will be discussed with Dr. Gerard Jim.     12:13 PM, 07/23/21  Rosa Maria Barclay, MD  Family Medicine Resident   PGY1       For Billing    Chief Complaint   Patient presents with   [facility] Problems  Date Reviewed: 3/16/2020    None

## 2021-07-23 NOTE — PROGRESS NOTES
Bedside and Verbal shift change report given to Abbie Pradhan RN (oncoming nurse) by June Carroll RN (offgoing nurse). Report included the following information SBAR, Kardex, Intake/Output, MAR, Accordion and Recent Results.

## 2021-07-23 NOTE — ED TRIAGE NOTES
Pt ambulatory to triage for bruising and petechiae to arms and legs. Pts legs are covered in petechia and large bruises. States that she is coughing up some blood, has blood blisters in mouth, and has spots in eyes.

## 2021-07-23 NOTE — PROGRESS NOTES
I have discussed with the patient the rationale for blood component transfusion; its benefits in treating or preventing fatigue, organ damage, or death; and its risk which includes mild transfusion reactions, rare risk of blood borne infection, or more serious but rare reactions. I have discussed the alternatives to transfusion, including the risk and consequences of not receiving transfusion. The patient had an opportunity to ask questions and had agreed to proceed with transfusion of blood components.     Shahriar Carrizales DO  Family Medicine Resident

## 2021-07-24 LAB
ALBUMIN SERPL-MCNC: 3.6 G/DL (ref 3.5–5)
ALBUMIN/GLOB SERPL: 1 {RATIO} (ref 1.1–2.2)
ALP SERPL-CCNC: 101 U/L (ref 45–117)
ALT SERPL-CCNC: 22 U/L (ref 12–78)
ANION GAP SERPL CALC-SCNC: 4 MMOL/L (ref 5–15)
AST SERPL-CCNC: 26 U/L (ref 15–37)
BASOPHILS # BLD: 0 K/UL (ref 0–0.1)
BASOPHILS NFR BLD: 0 % (ref 0–1)
BILIRUB SERPL-MCNC: 0.5 MG/DL (ref 0.2–1)
BLD PROD TYP BPU: NORMAL
BPU ID: NORMAL
BUN SERPL-MCNC: 19 MG/DL (ref 6–20)
BUN/CREAT SERPL: 32 (ref 12–20)
CALCIUM SERPL-MCNC: 8.5 MG/DL (ref 8.5–10.1)
CHLORIDE SERPL-SCNC: 109 MMOL/L (ref 97–108)
CO2 SERPL-SCNC: 27 MMOL/L (ref 21–32)
CREAT SERPL-MCNC: 0.6 MG/DL (ref 0.55–1.02)
DIFFERENTIAL METHOD BLD: ABNORMAL
EOSINOPHIL # BLD: 0 K/UL (ref 0–0.4)
EOSINOPHIL NFR BLD: 0 % (ref 0–7)
ERYTHROCYTE [DISTWIDTH] IN BLOOD BY AUTOMATED COUNT: 13.3 % (ref 11.5–14.5)
EST. AVERAGE GLUCOSE BLD GHB EST-MCNC: 117 MG/DL
GLOBULIN SER CALC-MCNC: 3.5 G/DL (ref 2–4)
GLUCOSE BLD STRIP.AUTO-MCNC: 146 MG/DL (ref 65–117)
GLUCOSE BLD STRIP.AUTO-MCNC: 149 MG/DL (ref 65–117)
GLUCOSE BLD STRIP.AUTO-MCNC: 244 MG/DL (ref 65–117)
GLUCOSE SERPL-MCNC: 161 MG/DL (ref 65–100)
HBA1C MFR BLD: 5.7 % (ref 4–5.6)
HBV CORE AB SERPL QL IA: NEGATIVE
HCT VFR BLD AUTO: 33.5 % (ref 35–47)
HCV AB SERPL QL IA: NONREACTIVE
HCV COMMENT,HCGAC: NORMAL
HGB BLD-MCNC: 10.8 G/DL (ref 11.5–16)
IMM GRANULOCYTES # BLD AUTO: 0 K/UL (ref 0–0.04)
IMM GRANULOCYTES NFR BLD AUTO: 1 % (ref 0–0.5)
LYMPHOCYTES # BLD: 0.6 K/UL (ref 0.8–3.5)
LYMPHOCYTES NFR BLD: 10 % (ref 12–49)
MCH RBC QN AUTO: 29.9 PG (ref 26–34)
MCHC RBC AUTO-ENTMCNC: 32.2 G/DL (ref 30–36.5)
MCV RBC AUTO: 92.8 FL (ref 80–99)
MONOCYTES # BLD: 0.2 K/UL (ref 0–1)
MONOCYTES NFR BLD: 3 % (ref 5–13)
NEUTS SEG # BLD: 5.7 K/UL (ref 1.8–8)
NEUTS SEG NFR BLD: 87 % (ref 32–75)
NRBC # BLD: 0 K/UL (ref 0–0.01)
NRBC BLD-RTO: 0 PER 100 WBC
PLATELET # BLD AUTO: 36 K/UL (ref 150–400)
PMV BLD AUTO: 11.4 FL (ref 8.9–12.9)
POTASSIUM SERPL-SCNC: 4 MMOL/L (ref 3.5–5.1)
PROT SERPL-MCNC: 7.1 G/DL (ref 6.4–8.2)
RBC # BLD AUTO: 3.61 M/UL (ref 3.8–5.2)
SERVICE CMNT-IMP: ABNORMAL
SODIUM SERPL-SCNC: 140 MMOL/L (ref 136–145)
STATUS OF UNIT,%ST: NORMAL
UNIT DIVISION, %UDIV: 0
WBC # BLD AUTO: 6.6 K/UL (ref 3.6–11)

## 2021-07-24 PROCEDURE — 74011250637 HC RX REV CODE- 250/637: Performed by: STUDENT IN AN ORGANIZED HEALTH CARE EDUCATION/TRAINING PROGRAM

## 2021-07-24 PROCEDURE — 85025 COMPLETE CBC W/AUTO DIFF WBC: CPT

## 2021-07-24 PROCEDURE — 80053 COMPREHEN METABOLIC PANEL: CPT

## 2021-07-24 PROCEDURE — 74011250637 HC RX REV CODE- 250/637

## 2021-07-24 PROCEDURE — 74011250637 HC RX REV CODE- 250/637: Performed by: NURSE PRACTITIONER

## 2021-07-24 PROCEDURE — 36415 COLL VENOUS BLD VENIPUNCTURE: CPT

## 2021-07-24 PROCEDURE — 82962 GLUCOSE BLOOD TEST: CPT

## 2021-07-24 PROCEDURE — 99233 SBSQ HOSP IP/OBS HIGH 50: CPT | Performed by: STUDENT IN AN ORGANIZED HEALTH CARE EDUCATION/TRAINING PROGRAM

## 2021-07-24 PROCEDURE — 99232 SBSQ HOSP IP/OBS MODERATE 35: CPT | Performed by: FAMILY MEDICINE

## 2021-07-24 PROCEDURE — 74011636637 HC RX REV CODE- 636/637

## 2021-07-24 PROCEDURE — 65270000029 HC RM PRIVATE

## 2021-07-24 PROCEDURE — 74011250636 HC RX REV CODE- 250/636: Performed by: NURSE PRACTITIONER

## 2021-07-24 PROCEDURE — 74011250636 HC RX REV CODE- 250/636

## 2021-07-24 PROCEDURE — 83036 HEMOGLOBIN GLYCOSYLATED A1C: CPT

## 2021-07-24 RX ORDER — BUTALBITAL, ACETAMINOPHEN AND CAFFEINE 50; 325; 40 MG/1; MG/1; MG/1
1 TABLET ORAL
Status: DISCONTINUED | OUTPATIENT
Start: 2021-07-24 | End: 2021-07-26 | Stop reason: HOSPADM

## 2021-07-24 RX ORDER — MAGNESIUM SULFATE 100 %
4 CRYSTALS MISCELLANEOUS AS NEEDED
Status: DISCONTINUED | OUTPATIENT
Start: 2021-07-24 | End: 2021-07-26 | Stop reason: HOSPADM

## 2021-07-24 RX ORDER — DEXTROSE 50 % IN WATER (D50W) INTRAVENOUS SYRINGE
25-50 AS NEEDED
Status: DISCONTINUED | OUTPATIENT
Start: 2021-07-24 | End: 2021-07-26 | Stop reason: HOSPADM

## 2021-07-24 RX ORDER — INSULIN LISPRO 100 [IU]/ML
INJECTION, SOLUTION INTRAVENOUS; SUBCUTANEOUS
Status: DISCONTINUED | OUTPATIENT
Start: 2021-07-24 | End: 2021-07-26 | Stop reason: HOSPADM

## 2021-07-24 RX ADMIN — BUTALBITAL, ACETAMINOPHEN, AND CAFFEINE 1 TABLET: 50; 325; 40 TABLET ORAL at 17:22

## 2021-07-24 RX ADMIN — PANTOPRAZOLE SODIUM 40 MG: 40 TABLET, DELAYED RELEASE ORAL at 08:29

## 2021-07-24 RX ADMIN — INSULIN LISPRO 3 UNITS: 100 INJECTION, SOLUTION INTRAVENOUS; SUBCUTANEOUS at 17:24

## 2021-07-24 RX ADMIN — PANTOPRAZOLE SODIUM 40 MG: 40 TABLET, DELAYED RELEASE ORAL at 17:22

## 2021-07-24 RX ADMIN — DEXAMETHASONE 40 MG: 4 TABLET ORAL at 08:29

## 2021-07-24 RX ADMIN — Medication 10 ML: at 04:23

## 2021-07-24 RX ADMIN — ACETAMINOPHEN 650 MG: 325 TABLET ORAL at 08:28

## 2021-07-24 RX ADMIN — SODIUM CHLORIDE 100 ML/HR: 9 INJECTION, SOLUTION INTRAVENOUS at 21:54

## 2021-07-24 RX ADMIN — SODIUM CHLORIDE 100 ML/HR: 9 INJECTION, SOLUTION INTRAVENOUS at 04:23

## 2021-07-24 RX ADMIN — DIPHENHYDRAMINE HYDROCHLORIDE 50 MG: 25 CAPSULE ORAL at 21:54

## 2021-07-24 RX ADMIN — Medication 10 ML: at 21:55

## 2021-07-24 NOTE — PROGRESS NOTES
Bedside shift change report given to Chris Reilly RN (oncoming nurse) by Xochilt Gaston RN (offgoing nurse). Report included the following information SBAR, Kardex, Intake/Output, MAR and Recent Results.

## 2021-07-24 NOTE — PROGRESS NOTES
Progress Note     7/24/2021  PCP: Abhijeet Samuels NP     Assessment/Plan:     Prudence Elam is a 64 y.o. female with a PMHx of Fibromyalgia, DDD, and postivie STEPHEN for RA/Sjogrens/Lupus who is admitted for Immune Thrombocytopenia. Followed by Heme/Onc, s/p Platelet Transfusion (7/23).    Thrombocytopenia w/ Autoimmune History (suspect ITP): Platelets POA of < 3 >> 36. History of multiple positive STEPHEN (RA/sjogrens/lupus). Three days of increased bruising and petechia, dark stools. Heme/Onc and GI consulted. S/p Platelet transfusion 7/23.   - Follow up Heme/Onc consult, appreciate recs    - Started on Decadron 40mg daily (Day 2 of 4)   - Follow up labs for HIV, Hep C, LD. Peripheral smear, Fibrinogen. - Follow up Tick studies (rickettsia, lyme, ehrlichiosis)  - Daily CMP / CBC     GI Bleed: History of dark stools x1 day and small amount of BRBPR in the setting of ITP. Patient does have history of hemorrhoids. Consulted GI. + FOBT  - f/u GI consult, appreciate recs. Plan for possible outpatient EGD and colonoscopy once platelet count resolves. - daily CBC    Fibromyalgia: chronic, stable  - Continue home Tizanidine 4mg nightly for muscle spasms     Degenerative Disc Disease: Chronic, stable     Insomnia: chronic stable   - continue home Melatonin nightly     GERD: chronic, history of H. Pylori x5 years ago. No home treatment now. 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 Managed now with chiropractics and supplements  - Holding home supplements (Vit C, glucosamine chonroitin, Turmeric, Omega 3)        FEN/GI - Regular diet. -   Activity - Ambulate with assistance  DVT prophylaxis - None indicated at this time  GI prophylaxis - Protonix  Fall prophylaxis - Not indicated at this time. Disposition - Admit to Medical. Plan to d/c to Home. Consulting -  Code Status - DNR.  Discussed with patient / caregivers. Next of Kin Name and Contact - Yudi Rodrigez,  Spouse -  Child St discussed with Dr. Augusta Moore. Subjective:   Pt was seen and examined at bedside. Afebrile and hemodynamically stable. Patient is complaining of a mild occipital headache, but believes it to be secondary to poor sleeping position. No new bleeding, petechia unchanged. Objective:   Physical examination  Patient Vitals for the past 24 hrs:   Temp Pulse Resp BP SpO2   21 1103 98.4 °F (36.9 °C) 62 16 101/65 98 %   21 0921 98.9 °F (37.2 °C) 84 16 101/64 95 %   21 0306 98.4 °F (36.9 °C) 63 16 96/60 97 %   21 0002 98.6 °F (37 °C) 70 16 110/67 98 %   21 2256 98.6 °F (37 °C) 80 16 110/70 98 %   21 2140 97.7 °F (36.5 °C) 71 16 128/82 98 %   21 2037 98.2 °F (36.8 °C) 68 16 110/69 94 %   21 98.8 °F (37.1 °C) 65 16 110/72 98 %   21 195 98 °F (36.7 °C) 70 16 109/70 97 %   21 1618 98.8 °F (37.1 °C) (!) 56 17 (!) 108/57 93 %      Temp (24hrs), Av.4 °F (36.9 °C), Min:97.7 °F (36.5 °C), Max:98.9 °F (37.2 °C)         O2 Device: None (Room air)    Date 21 - 21 0621 - 21 0659   Shift [id number] 24 Hour Total 9093-2400 4467-6533 24 Hour Total   INTAKE   P.O.  360 360        P. O.  360 360      Blood  360.8 360.8        Volume (TRANSFUSE PLATELETS)  370.4 563.3      Shift Total(mL/kg)  720. 8(11.8) 720. 8(11.8)      OUTPUT   Urine(mL/kg/hr)           Urine Occurrence(s)  4 x 4 x      Stool           Stool Occurrence(s)  1 x 1 x      Shift Total(mL/kg)         NET  720.8 720.8      Weight (kg) 61.2 61.2 61.2 61.2 61.2 61.2     General:   Alert, cooperative, no acute distress   Head:   Atraumatic   Eyes:   Single ocular petechia   ENT:  Oral mucosa with small lesions, no current bleeding   Neck:  Supple, trachea midline, no adenopathy   No JVD   Back:    No CVA tenderness    Chest wall:    No tenderness or deformities    Lungs:   Clear to auscultation bilaterally    Heart:   Normal rate, regular rhythm, no murmur, rubs or gallops   Abdomen:    Soft, non-tender   No masses or organomegaly    Extremities:  No edema or DVT signs   Pulses:  Symmetric all extremities   Skin:  Warm and dry   Multiple scattered petechia and bruising   Neurologic:  Alert and oriented x4   No focal deficits   Urinary catheter:  No     Data Review:     CBC:  Recent Labs     07/24/21  0417 07/23/21  1114   WBC 6.6 5.0   HGB 10.8* 11.9   HCT 33.5* 37.0   PLT 36* <3*     Metabolic Panel:  Recent Labs     07/24/21  0417 07/23/21  1352 07/23/21  1114     --  140   K 4.0  --  3.7   *  --  106   CO2 27  --  31   BUN 19  --  17   CREA 0.60  --  0.68   *  --  98   CA 8.5  --  9.1   ALB 3.6  --  4.2   TBILI 0.5  --  0.7   ALT 22  --  24   INR  --  1.1  --      Micro:  No results found for: CULT  Imaging:  No results found.   Medications reviewed  Current Facility-Administered Medications   Medication Dose Route Frequency    glucose chewable tablet 16 g  4 Tablet Oral PRN    dextrose (D50W) injection syrg 12.5-25 g  25-50 mL IntraVENous PRN    glucagon (GLUCAGEN) injection 1 mg  1 mg IntraMUSCular PRN    insulin lispro (HUMALOG) injection   SubCUTAneous AC&HS    0.9% sodium chloride infusion 250 mL  250 mL IntraVENous PRN    dexAMETHasone (DECADRON) tablet 40 mg  40 mg Oral DAILY    pantoprazole (PROTONIX) tablet 40 mg  40 mg Oral BID    sodium chloride (NS) flush 5-40 mL  5-40 mL IntraVENous Q8H    sodium chloride (NS) flush 5-40 mL  5-40 mL IntraVENous PRN    acetaminophen (TYLENOL) tablet 650 mg  650 mg Oral Q6H PRN    Or    acetaminophen (TYLENOL) suppository 650 mg  650 mg Rectal Q6H PRN    polyethylene glycol (MIRALAX) packet 17 g  17 g Oral DAILY PRN    0.9% sodium chloride infusion  100 mL/hr IntraVENous CONTINUOUS    tiZANidine (ZANAFLEX) tablet 4 mg  4 mg Oral QHS PRN    melatonin (rapid dissolve) tablet 10 mg 10 mg Oral QHS PRN    diphenhydrAMINE (BENADRYL) capsule 50 mg  50 mg Oral Q6H PRN         Signed:   Matt Molina MD   Resident, Family Medicine      Attending note: Attending note to follow. ..

## 2021-07-24 NOTE — PROGRESS NOTES
[facility] [place] at [address] [facility] [address] [facility], [address] [facility]  W: [phone] F: [phone]      Reason for Visit:   Wing Adams is a 64 y.o. female who is seen in consultation at the request of Dr. David Murillo for severe thrombocytopenia. Subsequent inpatient encounter. Hematology / Oncology Treatment History:     Hematological/Oncological Diagnosis: Severe thrombocytopenia with concern for ITP    Date of Diagnosis: 7/23/21    Treatment course:  40 mg Dexamethasone x 4 days starting 7/23/21, Platelet transfusion x once on 7/23/21      History of Present Illness:     64 y.o. F who presents as an inpatient consultation for severe thrombocytopenia. The patient reported that she is dealing with dark stools on 7/23/21 and petechiae with the later developing over the prior 2-3 days. Hospital Course: The patient doing is doing well with steroid challenge. She has side effects of headache that is mild to moderate. No other focal neurological deficits. Pain managed with tylenol. Some difficulty sleeping and elevated glucose as expected with high dose decadron. No other significant clinical symptoms. No new bleeding or bruising. Review of Systems: A complete review of systems was obtained, negative except as described above. Past Medical History:   Diagnosis Date    Arthritis     Degenerative disc disease     Fibromyalgia       Past Surgical History:   Procedure Laterality Date    HX HEENT      HX ORTHOPAEDIC        Social History     Tobacco Use    Smoking status: Never Smoker    Smokeless tobacco: Never Used   Substance Use Topics    Alcohol use:  Yes     Alcohol/week: 1.7 standard drinks     Types: 2 Glasses of wine per week      Family History   Problem Relation Age of Onset    Parkinsonism Father      Current Facility-Administered Medications   Medication Dose Route Frequency    glucose chewable tablet 16 g  4 Tablet Oral PRN    dextrose (D50W) injection syrg 12.5-25 g  25-50 mL IntraVENous PRN    glucagon (GLUCAGEN) injection 1 mg  1 mg IntraMUSCular PRN    insulin lispro (HUMALOG) injection   SubCUTAneous AC&HS    0.9% sodium chloride infusion 250 mL  250 mL IntraVENous PRN    dexAMETHasone (DECADRON) tablet 40 mg  40 mg Oral DAILY    pantoprazole (PROTONIX) tablet 40 mg  40 mg Oral BID    sodium chloride (NS) flush 5-40 mL  5-40 mL IntraVENous Q8H    sodium chloride (NS) flush 5-40 mL  5-40 mL IntraVENous PRN    acetaminophen (TYLENOL) tablet 650 mg  650 mg Oral Q6H PRN    Or    acetaminophen (TYLENOL) suppository 650 mg  650 mg Rectal Q6H PRN    polyethylene glycol (MIRALAX) packet 17 g  17 g Oral DAILY PRN    0.9% sodium chloride infusion  100 mL/hr IntraVENous CONTINUOUS    tiZANidine (ZANAFLEX) tablet 4 mg  4 mg Oral QHS PRN    melatonin (rapid dissolve) tablet 10 mg  10 mg Oral QHS PRN    diphenhydrAMINE (BENADRYL) capsule 50 mg  50 mg Oral Q6H PRN      Allergies   Allergen Reactions    Doxycycline Other (comments)     headache    Sulfa (Sulfonamide Antibiotics) Other (comments)     headache            Physical Exam:     Visit Vitals  /65 (BP 1 Location: Right upper arm, BP Patient Position: At rest;Sitting)   Pulse 62   Temp 98.4 °F (36.9 °C)   Resp 16   Ht 5' 3\" (1.6 m)   Wt 135 lb (61.2 kg)   SpO2 98%   BMI 23.91 kg/m²     ECOG PS: 0  General: alert, cooperative, no distress   Mental  status: normal mood, behavior, speech, dress, motor activity, and thought processes, able to follow commands   HENT: NCAT   Neck: no visualized mass   Resp: no respiratory distress   Neuro: no gross deficits   Skin: Petechia on lower ext.     Psychiatric: normal affect, consistent with stated mood, no evidence of hallucinations         Results:     Lab Results   Component Value Date/Time    WBC 6.6 07/24/2021 04:17 AM    HGB 10.8 (L) 07/24/2021 04:17 AM    HCT 33.5 (L) 07/24/2021 04:17 AM PLATELET 36 (LL) 76/39/1986 04:17 AM    MCV 92.8 07/24/2021 04:17 AM    ABS. NEUTROPHILS 5.7 07/24/2021 04:17 AM     Lab Results   Component Value Date/Time    Sodium 140 07/24/2021 04:17 AM    Potassium 4.0 07/24/2021 04:17 AM    Chloride 109 (H) 07/24/2021 04:17 AM    CO2 27 07/24/2021 04:17 AM    Glucose 161 (H) 07/24/2021 04:17 AM    BUN 19 07/24/2021 04:17 AM    Creatinine 0.60 07/24/2021 04:17 AM    GFR est AA >60 07/24/2021 04:17 AM    GFR est non-AA >60 07/24/2021 04:17 AM    Calcium 8.5 07/24/2021 04:17 AM    Glucose (POC) 149 (H) 07/24/2021 11:04 AM     Lab Results   Component Value Date/Time    Bilirubin, total 0.5 07/24/2021 04:17 AM    ALT (SGPT) 22 07/24/2021 04:17 AM    Alk. phosphatase 101 07/24/2021 04:17 AM    Protein, total 7.1 07/24/2021 04:17 AM    Albumin 3.6 07/24/2021 04:17 AM    Globulin 3.5 07/24/2021 04:17 AM     Assessment and Recommendations:     # Severe thrombocytopenia consistent with ITP  - Tolerating oral high dose decadron well. Today is day 2 of 4 for treatment with 40 mg of Decadron. She did have improvement in PLT to 36 this AM.  Notably, the patient did receive PLT transfusion yesterday. Will monitor daily for platelet trend. - The patient will need outpatient follow up with hematology upon acute care discharge. She wishes to follow at [facility] as it is closest to her residence. - no active bleeding today. No reported melanotic stools today. # Normocytic anemia  - may be a consequence of slow GI bleeding in the setting of severe ITP. Stool guaiac positive. - no acute inpatient intervention required at this point.   - Appreciate GI input. Plan for close outpatient follow up upon acute care discharge. Please call oncology with questions or concerns.        Signed By: Lynsey Tee MD      Attending Medical Oncologist   [facility]

## 2021-07-24 NOTE — PROGRESS NOTES
Patient seen and examined with attending physician, Dr. Rita Jimenez, on rounds. She reports that her headache has worsened slightly and is now in the occipital and frontal regions, however she denies any other new neurological symptoms including vision changes, speech changes, facial droop, numbness, weakness. She states that her headache is not severe and feels like a typical headache. Explained to patient that low platelet count increases her risk for spontaneous intracranial bleeds and that we would recommend a head CT at this time given worsening pain. She declines a head CT, stating that even if she had a known head bleed she would not want neurosurgical intervention. She expresses understanding of the risk of intracranial hemorrhage, including death. She agrees to monitor her symptoms and let us know if she has any worsening pain or new neurological symptoms. She is neurologically intact on exam. Will continue to monitor.      Enrigue Monday, DO  Family Medicine Resident

## 2021-07-24 NOTE — PROGRESS NOTES
Bedside shift change report given to Flora (oncoming nurse) by Suzanne Garcia (offgoing nurse). Report included the following information SBAR, Kardex, Intake/Output, MAR and Recent Results.

## 2021-07-25 LAB
ALBUMIN SERPL-MCNC: 3.7 G/DL (ref 3.5–5)
ALBUMIN/GLOB SERPL: 1.2 {RATIO} (ref 1.1–2.2)
ALP SERPL-CCNC: 96 U/L (ref 45–117)
ALT SERPL-CCNC: 21 U/L (ref 12–78)
ANION GAP SERPL CALC-SCNC: 3 MMOL/L (ref 5–15)
AST SERPL-CCNC: 20 U/L (ref 15–37)
ATRIAL RATE: 63 BPM
BASOPHILS # BLD: 0 K/UL (ref 0–0.1)
BASOPHILS NFR BLD: 0 % (ref 0–1)
BILIRUB SERPL-MCNC: 0.6 MG/DL (ref 0.2–1)
BUN SERPL-MCNC: 19 MG/DL (ref 6–20)
BUN/CREAT SERPL: 30 (ref 12–20)
CALCIUM SERPL-MCNC: 8.9 MG/DL (ref 8.5–10.1)
CALCULATED P AXIS, ECG09: 59 DEGREES
CALCULATED R AXIS, ECG10: 23 DEGREES
CALCULATED T AXIS, ECG11: 54 DEGREES
CHLORIDE SERPL-SCNC: 111 MMOL/L (ref 97–108)
CO2 SERPL-SCNC: 27 MMOL/L (ref 21–32)
CREAT SERPL-MCNC: 0.64 MG/DL (ref 0.55–1.02)
DIAGNOSIS, 93000: NORMAL
DIFFERENTIAL METHOD BLD: ABNORMAL
EOSINOPHIL # BLD: 0 K/UL (ref 0–0.4)
EOSINOPHIL NFR BLD: 0 % (ref 0–7)
ERYTHROCYTE [DISTWIDTH] IN BLOOD BY AUTOMATED COUNT: 13.4 % (ref 11.5–14.5)
GLOBULIN SER CALC-MCNC: 3.1 G/DL (ref 2–4)
GLUCOSE BLD STRIP.AUTO-MCNC: 112 MG/DL (ref 65–117)
GLUCOSE BLD STRIP.AUTO-MCNC: 136 MG/DL (ref 65–117)
GLUCOSE BLD STRIP.AUTO-MCNC: 148 MG/DL (ref 65–117)
GLUCOSE BLD STRIP.AUTO-MCNC: 195 MG/DL (ref 65–117)
GLUCOSE SERPL-MCNC: 131 MG/DL (ref 65–100)
HCT VFR BLD AUTO: 30.3 % (ref 35–47)
HGB BLD-MCNC: 9.7 G/DL (ref 11.5–16)
IMM GRANULOCYTES # BLD AUTO: 0.1 K/UL (ref 0–0.04)
IMM GRANULOCYTES NFR BLD AUTO: 0 % (ref 0–0.5)
LYMPHOCYTES # BLD: 1.2 K/UL (ref 0.8–3.5)
LYMPHOCYTES NFR BLD: 9 % (ref 12–49)
MCH RBC QN AUTO: 30.4 PG (ref 26–34)
MCHC RBC AUTO-ENTMCNC: 32 G/DL (ref 30–36.5)
MCV RBC AUTO: 95 FL (ref 80–99)
MONOCYTES # BLD: 1.2 K/UL (ref 0–1)
MONOCYTES NFR BLD: 9 % (ref 5–13)
NEUTS SEG # BLD: 11.3 K/UL (ref 1.8–8)
NEUTS SEG NFR BLD: 82 % (ref 32–75)
NRBC # BLD: 0 K/UL (ref 0–0.01)
NRBC BLD-RTO: 0 PER 100 WBC
P-R INTERVAL, ECG05: 184 MS
PLATELET # BLD AUTO: 50 K/UL (ref 150–400)
PMV BLD AUTO: 12.5 FL (ref 8.9–12.9)
POTASSIUM SERPL-SCNC: 3.8 MMOL/L (ref 3.5–5.1)
PROT SERPL-MCNC: 6.8 G/DL (ref 6.4–8.2)
Q-T INTERVAL, ECG07: 418 MS
QRS DURATION, ECG06: 78 MS
QTC CALCULATION (BEZET), ECG08: 427 MS
RBC # BLD AUTO: 3.19 M/UL (ref 3.8–5.2)
SERVICE CMNT-IMP: ABNORMAL
SERVICE CMNT-IMP: NORMAL
SODIUM SERPL-SCNC: 141 MMOL/L (ref 136–145)
VENTRICULAR RATE, ECG03: 63 BPM
WBC # BLD AUTO: 13.8 K/UL (ref 3.6–11)

## 2021-07-25 PROCEDURE — 99232 SBSQ HOSP IP/OBS MODERATE 35: CPT | Performed by: FAMILY MEDICINE

## 2021-07-25 PROCEDURE — 74011250637 HC RX REV CODE- 250/637: Performed by: NURSE PRACTITIONER

## 2021-07-25 PROCEDURE — 65270000029 HC RM PRIVATE

## 2021-07-25 PROCEDURE — 85025 COMPLETE CBC W/AUTO DIFF WBC: CPT

## 2021-07-25 PROCEDURE — 99233 SBSQ HOSP IP/OBS HIGH 50: CPT | Performed by: STUDENT IN AN ORGANIZED HEALTH CARE EDUCATION/TRAINING PROGRAM

## 2021-07-25 PROCEDURE — 80053 COMPREHEN METABOLIC PANEL: CPT

## 2021-07-25 PROCEDURE — 74011250636 HC RX REV CODE- 250/636: Performed by: NURSE PRACTITIONER

## 2021-07-25 PROCEDURE — 74011250637 HC RX REV CODE- 250/637: Performed by: STUDENT IN AN ORGANIZED HEALTH CARE EDUCATION/TRAINING PROGRAM

## 2021-07-25 PROCEDURE — 36415 COLL VENOUS BLD VENIPUNCTURE: CPT

## 2021-07-25 PROCEDURE — 82962 GLUCOSE BLOOD TEST: CPT

## 2021-07-25 RX ADMIN — DIPHENHYDRAMINE HYDROCHLORIDE 50 MG: 25 CAPSULE ORAL at 21:57

## 2021-07-25 RX ADMIN — DEXAMETHASONE 40 MG: 4 TABLET ORAL at 08:17

## 2021-07-25 RX ADMIN — BUTALBITAL, ACETAMINOPHEN, AND CAFFEINE 1 TABLET: 50; 325; 40 TABLET ORAL at 11:03

## 2021-07-25 RX ADMIN — PANTOPRAZOLE SODIUM 40 MG: 40 TABLET, DELAYED RELEASE ORAL at 17:12

## 2021-07-25 RX ADMIN — PANTOPRAZOLE SODIUM 40 MG: 40 TABLET, DELAYED RELEASE ORAL at 08:18

## 2021-07-25 NOTE — PROGRESS NOTES
Bedside shift change report given to [facility] (oncoming nurse) by Chava Arriola (offgoing nurse). Report included the following information SBAR, Kardex, Intake/Output, MAR and Recent Results.

## 2021-07-25 NOTE — PROGRESS NOTES
[facility] [place] at [address] [facility] [address] [facility]  W: [phone] F: [phone]      Reason for Visit:   Alem Wright is a 64 y.o. female who is seen in consultation at the request of Dr. Spenser Whitehead for severe thrombocytopenia. Subsequent inpatient encounter. Hematology / Oncology Treatment History:     Hematological/Oncological Diagnosis: Severe thrombocytopenia with concern for ITP    Date of Diagnosis: 7/23/21    Treatment course:  40 mg Dexamethasone x 4 days starting 7/23/21, Platelet transfusion x once on 7/23/21      History of Present Illness:     64 y.o. F who presents as an inpatient consultation for severe thrombocytopenia. The patient reported that she is dealing with dark stools on 7/23/21 and petechiae with the later developing over the prior 2-3 days. The patient doing is doing well with steroid challenge. She has side effects of headache that is mild to moderate. No other focal neurological deficits. Interval History:     PLT count increased to 50 this AM. Has GERD symptoms, was given protonix. No other new clinical symptoms reported. No other significant clinical symptoms. No new bleeding or bruising. Review of Systems: A complete review of systems was obtained, negative except as described above. Past Medical History:   Diagnosis Date    Arthritis     Degenerative disc disease     Fibromyalgia       Past Surgical History:   Procedure Laterality Date    HX HEENT      HX ORTHOPAEDIC        Social History     Tobacco Use    Smoking status: Never Smoker    Smokeless tobacco: Never Used   Substance Use Topics    Alcohol use:  Yes     Alcohol/week: 1.7 standard drinks     Types: 2 Glasses of wine per week      Family History   Problem Relation Age of Onset    Parkinsonism Father      Current Facility-Administered Medications   Medication Dose Route Frequency    insulin NPH (NOVOLIN N, HUMULIN N) injection 10 Units  10 Units SubCUTAneous DAILY    glucose chewable tablet 16 g  4 Tablet Oral PRN    dextrose (D50W) injection syrg 12.5-25 g  25-50 mL IntraVENous PRN    glucagon (GLUCAGEN) injection 1 mg  1 mg IntraMUSCular PRN    insulin lispro (HUMALOG) injection   SubCUTAneous AC&HS    butalbital-acetaminophen-caffeine (FIORICET, ESGIC) -40 mg per tablet 1 Tablet  1 Tablet Oral Q6H PRN    0.9% sodium chloride infusion 250 mL  250 mL IntraVENous PRN    dexAMETHasone (DECADRON) tablet 40 mg  40 mg Oral DAILY    pantoprazole (PROTONIX) tablet 40 mg  40 mg Oral BID    sodium chloride (NS) flush 5-40 mL  5-40 mL IntraVENous Q8H    sodium chloride (NS) flush 5-40 mL  5-40 mL IntraVENous PRN    acetaminophen (TYLENOL) tablet 650 mg  650 mg Oral Q6H PRN    Or    acetaminophen (TYLENOL) suppository 650 mg  650 mg Rectal Q6H PRN    polyethylene glycol (MIRALAX) packet 17 g  17 g Oral DAILY PRN    tiZANidine (ZANAFLEX) tablet 4 mg  4 mg Oral QHS PRN    melatonin (rapid dissolve) tablet 10 mg  10 mg Oral QHS PRN    diphenhydrAMINE (BENADRYL) capsule 50 mg  50 mg Oral Q6H PRN      Allergies   Allergen Reactions    Doxycycline Other (comments)     headache    Sulfa (Sulfonamide Antibiotics) Other (comments)     headache            Physical Exam:     Visit Vitals  /62 (BP 1 Location: Right upper arm, BP Patient Position: At rest;Sitting)   Pulse 72   Temp 98.2 °F (36.8 °C)   Resp 16   Ht 5' 3\" (1.6 m)   Wt 135 lb (61.2 kg)   SpO2 97%   BMI 23.91 kg/m²     ECOG PS: 0  General: alert, cooperative, no distress   Mental  status: normal mood, behavior, speech, dress, motor activity, and thought processes, able to follow commands   HENT: NCAT   Neck: no visualized mass   Resp: no respiratory distress   Neuro: no gross deficits   Skin: Petechia on lower ext.     Psychiatric: normal affect, consistent with stated mood, no evidence of hallucinations         Results:     Lab Results   Component Value Date/Time    WBC 13.8 (H) 07/25/2021 03:11 AM    HGB 9.7 (L) 07/25/2021 03:11 AM    HCT 30.3 (L) 07/25/2021 03:11 AM    PLATELET 50 (L) 86/25/2414 03:11 AM    MCV 95.0 07/25/2021 03:11 AM    ABS. NEUTROPHILS 11.3 (H) 07/25/2021 03:11 AM     Lab Results   Component Value Date/Time    Sodium 141 07/25/2021 03:11 AM    Potassium 3.8 07/25/2021 03:11 AM    Chloride 111 (H) 07/25/2021 03:11 AM    CO2 27 07/25/2021 03:11 AM    Glucose 131 (H) 07/25/2021 03:11 AM    BUN 19 07/25/2021 03:11 AM    Creatinine 0.64 07/25/2021 03:11 AM    GFR est AA >60 07/25/2021 03:11 AM    GFR est non-AA >60 07/25/2021 03:11 AM    Calcium 8.9 07/25/2021 03:11 AM    Glucose (POC) 195 (H) 07/25/2021 04:15 PM     Lab Results   Component Value Date/Time    Bilirubin, total 0.6 07/25/2021 03:11 AM    ALT (SGPT) 21 07/25/2021 03:11 AM    Alk. phosphatase 96 07/25/2021 03:11 AM    Protein, total 6.8 07/25/2021 03:11 AM    Albumin 3.7 07/25/2021 03:11 AM    Globulin 3.1 07/25/2021 03:11 AM     Assessment and Recommendations:     # Severe thrombocytopenia consistent with ITP  - Tolerating oral high dose decadron well. Today is day 3 of 4 for treatment with 40 mg of Decadron. She did have improvement in PLT to 50 this AM.  Will monitor daily for platelet trend. - The patient will need outpatient follow up with hematology upon acute care discharge. She wishes to follow at [facility] as it is closest to her residence. - no active bleeding today. No reported melanotic stools today. # Normocytic anemia  - Hg of 9.7 today, slightly decreased from 10.8. May reflect earlier GI bleed event. No current melanotic stools reported. Plan for close outpatient follow up upon acute care discharge. Please call oncology with questions or concerns.        Signed By: Michael Cueva MD      Attending Medical Oncologist   [facility]

## 2021-07-25 NOTE — PROGRESS NOTES
CARE MANAGEMENT INITIAL ASSESSEMENT      NAME:   Najma Delgadillo   :     1960   MRN:     [id number]       Emergency Contact:  Extended Emergency Contact Information  Primary Emergency Contact: Edgard Phone: [phone]  Relation: Spouse    Advance Directive:  DNR, has an advance directive. Copy not available. Radha Desir Healthcare Decision Maker:   aDr Chen- spouse- [phone]    Reason for Admission:  Ms. Viviana Miles is a 64 y.o. female with history that includes fibromyalgia and DDD  who was emergently admitted for:  immune thrombocytopenia    Patient Active Problem List   Diagnosis Code    Fibromyalgia M79.7    DDD (degenerative disc disease), cervical M50.30    DDD (degenerative disc disease), lumbar M51.36    Severe thrombocytopenia (HCC) D69.6    Melena K92.1    Low serum haptoglobin R77.8    High risk medication use Z79.899    Acute ITP ([facility] [address] D69.3    Petechiae R23.3       Assessment: In person with patient. RUR:  9%  Risk Level:  Low  Value-based purchasing:  Yes  Bundle patient:  No    Residency:  Private residence  Exterior Steps:  5  Interior Steps:  12. Pt reports bedroom is upstairs. Pt denies problems with stairs. Lives With:  Spouse    Prior functioning:  Independent. Patient requires assistance with:  N/A    Prior DME required:  None    DME available:  None    Rehab history:  None    Discharge Concerns:  None      Insurer:  Payor: Yancy Abraham / Plan: Treinta Y Be [id number] PPO / Product Type: PPO /     PCP: Alivia Dillard NP   Name of Practice:  [facility]   Current patient: Yes   Approximate date of last visit: 2021   Access to virtual PCP visits:  Yes    Pharmacy:  [facility]. Pt denies any problems obtaining/taking medications.       COVID-19 vaccination status:  Not vaccinated    DC Transport:  Family      Transition of care plan:  Home with outpatient follow-up     Comments:   CM met with Pt to complete initial assessment. Pt states that she lives at home with her spouse. Pt has no hx of HH, home O2, or equipment. Pt is independent with ADLs and ambulation. Pt denies problems with either. 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 Discharge plan is for Pt to return home. Pt states family will transport her home.     _____________________________________  Kimberly Ann[address] Management  7/25/2021   12:15 PM      Care Management Interventions  PCP Verified by CM: Yes Adrian Rhodes, NP)  Mode of Transport at Discharge:  Other (see comment) (spouse)  Transition of Care Consult (CM Consult): Discharge Planning  MyChart Signup: No  Discharge Durable Medical Equipment: No  Physical Therapy Consult: No  Occupational Therapy Consult: No  Speech Therapy Consult: No  Current Support Network: Lives with Spouse, Own Home  Confirm Follow Up Transport: Family  Discharge Location  Discharge Placement: Home with outpatient services

## 2021-07-25 NOTE — PROGRESS NOTES
Problem: Falls - Risk of  Goal: *Absence of Falls  Description: Document Virgil Pace Fall Risk and appropriate interventions in the flowsheet.   Outcome: Progressing Towards Goal  Note: Fall Risk Interventions:            Medication Interventions: Teach patient to arise slowly                   Problem: Patient Education: Go to Patient Education Activity  Goal: Patient/Family Education  Outcome: Progressing Towards Goal

## 2021-07-25 NOTE — PROGRESS NOTES
Progress Note     7/25/2021  PCP: Pebbles Herbert NP     Assessment/Plan:     Leobardo Gupta is a 64 y.o. female with a PMHx of Fibromyalgia, DDD, and postivie STEPHEN for RA/Sjogrens/Lupus who is admitted for Immune Thrombocytopenia.     Thrombocytopenia w/ Autoimmune History (suspect ITP): Platelets POA of < 3 >> 36. History of multiple positive STEPHEN (RA/sjogrens/lupus). S/p Platelet transfusion 7/23. - Heme/Onc following   - Continue Decadron 40mg daily (Day 3 of 4)   - NPH 10 units daily in the setting of Decadron  - Hypoglycemia protocols ordered  - Follow up Tick studies (rickettsia, lyme, ehrlichiosis)  - Daily CMP / CBC     Normocytic anemia likely 2/2 slow GI Bleed: Likely consequence of slow GI bleeding in the setting of severe ITP per Heme/Onc. + FOBT. GI suspects oozing of blood from intestine.  - GI recommends for EGD and colonoscopy after platelet count improves  - CBC daily  - IVF d/c'ed    Headache: Pt reported headache on 7/24 in the setting of thrombocytopenia. Declined head CT, acknowledged risks. She reports that Fioricet helped with symptoms yesterday. Pt reports mild headache symptoms coming on this morning.  - Continue to monitor  - Consider Fioricet    Low serum haptoglobin: Unclear meaning in setting of normal LDH and Total bilirubin. No schistocytosis. No signs of hemolytic anemia.  - Heme/Onc following    Fibromyalgia: chronic, stable  - Continue home Tizanidine 4mg nightly for muscle spasms     Degenerative Disc Disease: Chronic, stable     Insomnia: chronic stable   - continue home Melatonin nightly     GERD: chronic, history of H. Pylori x5 years ago. No home treatment now. 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 Managed now with chiropractics and supplements  - Holding home supplements (Vit C, glucosamine chonroitin, Turmeric, Omega 3)        FEN/GI - Regular diet.    Activity - Ambulate with assistance  DVT prophylaxis - None indicated at this time  GI prophylaxis - Protonix  Fall prophylaxis - Not indicated at this time. Disposition - Medical. Plan to d/c to Home. Code Status - DNR. Discussed with patient / caregivers. Next of Kin Name and Contact - Vahid Wesley,  Spouse -  Child St discussed with Dr. Naseem Beckwith. Subjective:   Pt was seen and examined at bedside. Afebrile and hemodynamically stable. Pt states that she no longer has a headache this AM.  She reports that her bowel movements are no longer dark. She denies hematochezia. No new rashes or bruising. Objective:   Physical examination  Patient Vitals for the past 24 hrs:   Temp Pulse Resp BP SpO2   21 0223 97.5 °F (36.4 °C) (!) 53 16 (!) 91/57 99 %   21 2318 97.9 °F (36.6 °C) (!) 54 16 124/60 100 %   21 1917 97.9 °F (36.6 °C) 64 16 130/81 100 %   21 1525 98.7 °F (37.1 °C) 86 16 108/66 97 %   21 1103 98.4 °F (36.9 °C) 62 16 101/65 98 %   21 0921 98.9 °F (37.2 °C) 84 16 101/64 95 %      Temp (24hrs), Av.2 °F (36.8 °C), Min:97.5 °F (36.4 °C), Max:98.9 °F (37.2 °C)         O2 Device: None (Room air)    Date 21 - 21 - 21 0659   Shift [id number] 24 Hour Total 2224-9244 6177-1564 24 Hour Total   INTAKE   I.V.(mL/kg/hr)  3103.3(4.2) 3103.3(2.1)        Volume (0.9% sodium chloride infusion)  3103.3 3103.3      Shift Total(mL/kg)  3103. 3(50.7) 3103. 3(50.7)      OUTPUT   Shift Total(mL/kg)         NET  3103.3 3103.3      Weight (kg) 61.2 61.2 61.2 61.2 61.2 61.2     Physical Examination:  General: No acute distress  Head: Normocephalic, atraumatic  CV: Heart: Regular rate  Resp: Lungs: clear to auscultation bilaterally  GI: + bowel sounds, non-tender to palpation, non-distended. Extremities: No lower extremity edema  Skin: Scattered petechiae over bilateral shins  Neuro: Awake, alert, and oriented.     Data Review:     CBC:  Recent Labs     07/25/21  0311 07/24/21  0417 07/23/21  1114   WBC 13.8* 6.6 5.0   HGB 9.7* 10.8* 11.9   HCT 30.3* 33.5* 37.0   PLT 50* 36* <3*     Metabolic Panel:  Recent Labs     07/25/21  0311 07/24/21  0417 07/23/21  1352 07/23/21  1114    140  --  140   K 3.8 4.0  --  3.7   * 109*  --  106   CO2 27 27  --  31   BUN 19 19  --  17   CREA 0.64 0.60  --  0.68   * 161*  --  98   CA 8.9 8.5  --  9.1   ALB 3.7 3.6  --  4.2   TBILI 0.6 0.5  --  0.7   ALT 21 22  --  24   INR  --   --  1.1  --      Micro:  No results found for: CULT  Imaging:  No results found.   Medications reviewed  Current Facility-Administered Medications   Medication Dose Route Frequency    insulin NPH (NOVOLIN N, HUMULIN N) injection 10 Units  10 Units SubCUTAneous DAILY    glucose chewable tablet 16 g  4 Tablet Oral PRN    dextrose (D50W) injection syrg 12.5-25 g  25-50 mL IntraVENous PRN    glucagon (GLUCAGEN) injection 1 mg  1 mg IntraMUSCular PRN    insulin lispro (HUMALOG) injection   SubCUTAneous AC&HS    butalbital-acetaminophen-caffeine (FIORICET, ESGIC) -40 mg per tablet 1 Tablet  1 Tablet Oral Q6H PRN    0.9% sodium chloride infusion 250 mL  250 mL IntraVENous PRN    dexAMETHasone (DECADRON) tablet 40 mg  40 mg Oral DAILY    pantoprazole (PROTONIX) tablet 40 mg  40 mg Oral BID    sodium chloride (NS) flush 5-40 mL  5-40 mL IntraVENous Q8H    sodium chloride (NS) flush 5-40 mL  5-40 mL IntraVENous PRN    acetaminophen (TYLENOL) tablet 650 mg  650 mg Oral Q6H PRN    Or    acetaminophen (TYLENOL) suppository 650 mg  650 mg Rectal Q6H PRN    polyethylene glycol (MIRALAX) packet 17 g  17 g Oral DAILY PRN    0.9% sodium chloride infusion  100 mL/hr IntraVENous CONTINUOUS    tiZANidine (ZANAFLEX) tablet 4 mg  4 mg Oral QHS PRN    melatonin (rapid dissolve) tablet 10 mg  10 mg Oral QHS PRN    diphenhydrAMINE (BENADRYL) capsule 50 mg  50 mg Oral Q6H PRN         Signed:   Maddy Craig, MD   Resident, Family Medicine      Attending note: Attending note to follow. ..

## 2021-07-25 NOTE — PROGRESS NOTES
Bedside, Verbal and Written shift change report given to [address] (oncoming nurse) by Saundra Anderson (offgoing nurse). Report included the following information SBAR, Kardex, OR Summary, Procedure Summary, Intake/Output, MAR, Recent Results and Med Rec Status.

## 2021-07-26 VITALS
WEIGHT: 135 LBS | HEART RATE: 53 BPM | RESPIRATION RATE: 17 BRPM | DIASTOLIC BLOOD PRESSURE: 67 MMHG | OXYGEN SATURATION: 98 % | HEIGHT: 63 IN | TEMPERATURE: 98.6 F | BODY MASS INDEX: 23.92 KG/M2 | SYSTOLIC BLOOD PRESSURE: 118 MMHG

## 2021-07-26 DIAGNOSIS — R23.3 PETECHIAE: ICD-10-CM

## 2021-07-26 DIAGNOSIS — D69.6 SEVERE THROMBOCYTOPENIA (HCC): ICD-10-CM

## 2021-07-26 DIAGNOSIS — D69.3 ACUTE ITP (HCC): ICD-10-CM

## 2021-07-26 DIAGNOSIS — K92.1 MELENA: ICD-10-CM

## 2021-07-26 PROBLEM — D64.9 NORMOCYTIC ANEMIA: Status: ACTIVE | Noted: 2021-07-26

## 2021-07-26 LAB
ANION GAP SERPL CALC-SCNC: 3 MMOL/L (ref 5–15)
B BURGDOR IGG+IGM SER-ACNC: <0.91 ISR (ref 0–0.9)
BASOPHILS # BLD: 0 K/UL (ref 0–0.1)
BASOPHILS NFR BLD: 0 % (ref 0–1)
BUN SERPL-MCNC: 20 MG/DL (ref 6–20)
BUN/CREAT SERPL: 33 (ref 12–20)
CALCIUM SERPL-MCNC: 8.4 MG/DL (ref 8.5–10.1)
CHLORIDE SERPL-SCNC: 110 MMOL/L (ref 97–108)
CO2 SERPL-SCNC: 28 MMOL/L (ref 21–32)
CREAT SERPL-MCNC: 0.6 MG/DL (ref 0.55–1.02)
DIFFERENTIAL METHOD BLD: ABNORMAL
EOSINOPHIL # BLD: 0 K/UL (ref 0–0.4)
EOSINOPHIL NFR BLD: 0 % (ref 0–7)
ERYTHROCYTE [DISTWIDTH] IN BLOOD BY AUTOMATED COUNT: 13.8 % (ref 11.5–14.5)
GLUCOSE BLD STRIP.AUTO-MCNC: 112 MG/DL (ref 65–117)
GLUCOSE SERPL-MCNC: 96 MG/DL (ref 65–100)
HCT VFR BLD AUTO: 30.4 % (ref 35–47)
HGB BLD-MCNC: 9.9 G/DL (ref 11.5–16)
IMM GRANULOCYTES # BLD AUTO: 0.1 K/UL (ref 0–0.04)
IMM GRANULOCYTES NFR BLD AUTO: 1 % (ref 0–0.5)
LYMPHOCYTES # BLD: 2.2 K/UL (ref 0.8–3.5)
LYMPHOCYTES NFR BLD: 22 % (ref 12–49)
MCH RBC QN AUTO: 30.4 PG (ref 26–34)
MCHC RBC AUTO-ENTMCNC: 32.6 G/DL (ref 30–36.5)
MCV RBC AUTO: 93.3 FL (ref 80–99)
MONOCYTES # BLD: 0.7 K/UL (ref 0–1)
MONOCYTES NFR BLD: 7 % (ref 5–13)
NEUTS SEG # BLD: 6.9 K/UL (ref 1.8–8)
NEUTS SEG NFR BLD: 70 % (ref 32–75)
NRBC # BLD: 0 K/UL (ref 0–0.01)
NRBC BLD-RTO: 0 PER 100 WBC
PLATELET # BLD AUTO: 95 K/UL (ref 150–400)
PMV BLD AUTO: 11.4 FL (ref 8.9–12.9)
POTASSIUM SERPL-SCNC: 4 MMOL/L (ref 3.5–5.1)
RBC # BLD AUTO: 3.26 M/UL (ref 3.8–5.2)
SERVICE CMNT-IMP: NORMAL
SODIUM SERPL-SCNC: 141 MMOL/L (ref 136–145)
WBC # BLD AUTO: 9.9 K/UL (ref 3.6–11)

## 2021-07-26 PROCEDURE — 74011250637 HC RX REV CODE- 250/637: Performed by: NURSE PRACTITIONER

## 2021-07-26 PROCEDURE — 82962 GLUCOSE BLOOD TEST: CPT

## 2021-07-26 PROCEDURE — 85025 COMPLETE CBC W/AUTO DIFF WBC: CPT

## 2021-07-26 PROCEDURE — 80048 BASIC METABOLIC PNL TOTAL CA: CPT

## 2021-07-26 PROCEDURE — 99238 HOSP IP/OBS DSCHRG MGMT 30/<: CPT | Performed by: FAMILY MEDICINE

## 2021-07-26 PROCEDURE — 74011250637 HC RX REV CODE- 250/637: Performed by: STUDENT IN AN ORGANIZED HEALTH CARE EDUCATION/TRAINING PROGRAM

## 2021-07-26 PROCEDURE — 36415 COLL VENOUS BLD VENIPUNCTURE: CPT

## 2021-07-26 PROCEDURE — 74011250636 HC RX REV CODE- 250/636: Performed by: NURSE PRACTITIONER

## 2021-07-26 PROCEDURE — 99232 SBSQ HOSP IP/OBS MODERATE 35: CPT | Performed by: INTERNAL MEDICINE

## 2021-07-26 RX ORDER — BUTALBITAL, ACETAMINOPHEN AND CAFFEINE 50; 325; 40 MG/1; MG/1; MG/1
1 TABLET ORAL
Qty: 15 TABLET | Refills: 0 | Status: SHIPPED | OUTPATIENT
Start: 2021-07-26 | End: 2021-08-08

## 2021-07-26 RX ORDER — PANTOPRAZOLE SODIUM 40 MG/1
40 TABLET, DELAYED RELEASE ORAL DAILY
Qty: 7 TABLET | Refills: 0 | Status: SHIPPED | OUTPATIENT
Start: 2021-07-26 | End: 2021-08-02

## 2021-07-26 RX ORDER — PANTOPRAZOLE SODIUM 40 MG/1
40 TABLET, DELAYED RELEASE ORAL 2 TIMES DAILY
Qty: 14 TABLET | Refills: 0 | Status: SHIPPED | OUTPATIENT
Start: 2021-07-26 | End: 2021-07-26

## 2021-07-26 RX ADMIN — BUTALBITAL, ACETAMINOPHEN, AND CAFFEINE 1 TABLET: 50; 325; 40 TABLET ORAL at 08:40

## 2021-07-26 RX ADMIN — PANTOPRAZOLE SODIUM 40 MG: 40 TABLET, DELAYED RELEASE ORAL at 08:40

## 2021-07-26 RX ADMIN — DEXAMETHASONE 40 MG: 4 TABLET ORAL at 08:40

## 2021-07-26 NOTE — PROGRESS NOTES
Problem: Falls - Risk of  Goal: *Absence of Falls  Description: Document Tonie Rushing Fall Risk and appropriate interventions in the flowsheet.   Outcome: Progressing Towards Goal  Note: Fall Risk Interventions:            Medication Interventions: Teach patient to arise slowly                   Problem: Patient Education: Go to Patient Education Activity  Goal: Patient/Family Education  Outcome: Progressing Towards Goal

## 2021-07-26 NOTE — ADT AUTH CERT NOTES
[address]     FACILITY NPI : [id number]    [address]  [phone]     Kimmy Bosch CONTACT   Vinita Lind [phone]      Patient Name :Abdirashid Roper   : 1960 (60 yrs)  MRN : [id number]     Patient Mailing Address [address] [place] [address] [47] , [address]                                                             .         Insurance Plan Payor: Yonathan Galeas / Plan: [facility] PPO / Product Type: PPO /      Primary Coverage Subscriber ID : [id number]           Current Patient Class : INPATIENT  Admit Date : 2021     REQUESTED LEVEL OF CARE: INPATIENT [101]                                                           Diagnosis : Thrombocytopenia ([facility]                          ICD10 Code : Thrombocytopenia [facility]) [D69.6]          Admitting and Attending Info:  Admitting Provider : Viraj Carvajal MD   NPI: [id number]  Admitting Provider Phone. [phone]  Admitting Provider Address: [address] [place] [address]     Attending Provider No att. providers found   NPI  Attending Provider Address: SAME AS FACILITY       Comments  Comment     Last edited by  on  [address] [address] Name: [address]                                Patient Demographics    Patient Name   Dhruv Spain [address] Sex   Female    1960 Address   [facility] 7 Phone   [phone] [facility])   [phone] (Work)   [phone] [facility] Account    Name Acct ID Class Status Primary Coverage   Warner August [id number] INPATIENT Discharged/Not [address] [place] [address] PPO          Guarantor Account (for Hospital Account [de-identified])    Name Relation to Pt Service Area Active?  Acct Type   DhruvCole Edwards.Medicus Y Self [facility] Yes Personal/Family   Address Phone     Matilde Raymundo [phone](T)   [phone](K)            Coverage Information (for Hospital Account [de-identified])    F/O Payor/Plan Subscriber  Subscriber Sex Precert #   BLUE CROSS/[place] BLUE CROSS [facility] PPO 64 F    Subscriber Subscriber #   Lakeisha Moe [id number]   Grp # Group Name    [facility] CARE    Address Phone   Ruta Goldberg [address]    Policy Number Status Effective Date Benefits Phone   [id number] -  -   Auth/Cert             Diagnosis     Codes Comments   Chronic tension-type headache, intractable  ICD-10-CM: N51.017   ICD-9-CM: 339.12           Admission Information    Arrival Date/Time: 2021 1026 Admit Date/Time: 2021 1035 IP Adm.  Date/Time: 2021 1208   Admission Type: Emergency Point of Origin: Non-health Care Facility/self Admit Category:    Means of Arrival: Car Primary Service: Medicine Secondary Service: N/A   Transfer Source:  Service Area: [facility] Unit: [facility]  MED SURG 2   Admit Provider: Jonas Alan MD Attending Provider: Serafin Hurtado DO Referring Provider:    Admission Information    Attending Provider Admission Dx Admitted on    Thrombocytopenia [facility]) 21   Service Isolation Code Status   MEDICINE  Prior   Allergies Advance Care Planning    Doxycycline, Sulfa (Sulfonamide Antibiotics) Jump to the Activity     Admission Information    Unit/Bed: [facility] 5M2 MED SURG  Service: MEDICINE   Admitting provider: Jonas Alan MD Phone: [phone]   Attending provider:  Phone:    PCP: Aldair Gamino NP Phone: [phone]   Admission dx:  Patient class: I   Admission type: ER     Patient Demographics    Patient Name   Harvinder Smart   [id number] Legal Sex   Female    1960 Address   [place] [address] Phone   [phone] [facility])   [phone] (Work)   [phone] (Mobile)   H&P Notes     H&P by Jp Villaseñor MD at 07/23/21 1213 documented on ED to Hosp-Admission (Discharged) from 7/23/2021 in [address]  Author: Jp Villaseñor MD Author Type: Resident Filed: 07/23/21 1817   Note Status: Attested Cosign: Cosigned by Daphne Ray MD at 07/23/21 1822 Date of Service: 07/23/21 1213   : Jp Villaseñor MD (Resident)   Attestation signed by Daphne Ray MD at 07/23/21 [facility]   I saw and evaluated the patient, performing the key elements of the service. I discussed the findings, assessment and plan with the resident and agree with the resident's findings and plan as documented in the resident's note. HDS  ITP: Transfuse PLT today. Decadron. Monitor PLT. Holding IVIG for now to determine response of decadron. Appreciate Heme/Onc recs. GIB: Monitor H/H.  HDS currently. Appreciate GI recs. Remainder per Resident's note. Expand AllCollapse All       [facility] 33   Office [phone]  Fax [phone]         Admission H&P      Name: Kiana Pham MRN: [id number]  Sex: Female   YOB: 1960  Age: 64 y.o. PCP: Kanwal Addison NP      Source of Information: patient, medical records     Chief complaint: Jeneal Quevedo / [place] Sep / Coughing blood     History of Present Illness  Isis Temple is a 64 y.o. female with PMHx of Fibromyalgia, non-differentiated connective tissue disease, multiple positive STEPHEN (rheumatoid, sjogrens, lupus)  and DDD who presents to the ED complaining of new onset bruising and bleeding. Bilateral petechia on lower extremities first noted three days ago. Spread to trunk today, and dark stool noted with some bright red blood which prompted her to present to the ER. She has also noticed sores in her mouth with some pink sputum noticed. Denies epistaxis and vomiting. Denies headache, fatigue, dizziness, chest pain. States this is the first time anything like this has happened. 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     Denies heparin exposure, recent infections, tonic water consumption, or quinine in supplements.         COVID Questions: Not vaccinated due to autoimmune history. Experiencing any of the following symptoms: fever, chills, cough, SOB, diarrhea, URI symptoms. None  Any Sick contacts with fever, cough, diarrhea, SOB, URI symptoms. None  Traveled out of state or out of country. None  Lives with . Has been staying at home. No, travels locally for work     In the ED:  Vitals: Temp 97.3   /82   HR 97   RR 16   SatO2  95% on RA  Labs: Platelet count of 3 (baseline 200s), Hgb 11.9, WBC 5.0. BMP wnl. Imaging: None  Treatment: None     EKG: Normal sinus     Patient Vitals for the past 12 hrs:    Temp Pulse Resp BP SpO2   07/23/21 1034 97.3 °F (36.3 °C) 97 16 119/82 95 %         Review of Systems  Review of Systems   Constitutional: Negative for chills, fatigue and fever. HENT: Positive for mouth sores. Negative for congestion and nosebleeds. Eyes: Negative for pain, discharge and visual disturbance. Respiratory: Negative for cough, chest tightness and wheezing. Cardiovascular: Negative for chest pain and palpitations. Gastrointestinal: Positive for abdominal pain and blood in stool. Negative for abdominal distention, diarrhea and rectal pain. Genitourinary: Negative for difficulty urinating, dysuria and hematuria. Musculoskeletal: Positive for arthralgias and myalgias. Skin: Positive for rash. Neurological: Negative for dizziness, weakness, light-headedness and headaches. Hematological: Bruises/bleeds easily. Psychiatric/Behavioral: Negative for confusion. The patient is not nervous/anxious.          Home Medications           Prior to Admission medications    Medication Sig Start Date End Date Taking? Authorizing Provider   cholecalciferol, vitamin D3, (Vitamin D3) 50 mcg (2,000 unit) tab Take  by mouth.  Take twice daily       Provider, Historical   tiZANidine (ZANAFLEX) 4 mg tablet Take 1 Tab by mouth three (3) times daily as needed for Muscle Spasm(s). 3/22/21     Stoney Robbins NP   calcium/mag/vitamin D2/Zn/min (ADAM-MAG ZINC II PO) Take  by mouth.       Provider, Historical   Omega-3 Fatty Acids 60- mg cpDR Take  by mouth.       Provider, Historical   multivitamin (ONE A DAY) tablet Take 1 Tab by mouth daily.       Provider, Historical   TURMERIC PO Take  by mouth.       Provider, Historical   B.infantis-B.ani-B.long-B.bifi (PROBIOTIC 4X) 10-15 mg TbEC Take  by mouth.       Provider, Historical   ascorbic acid, vitamin C, (VITAMIN C) 500 mg tablet Take 1,000 mg by mouth.       Provider, Historical   glucosamine-chondroitin (ARTHX) 500-400 mg cap Take 1 Cap by mouth daily.       Provider, Historical         Allergies  Allergies   Allergen Reactions    Doxycycline Other (comments)       headache    Sulfa (Sulfonamide Antibiotics) Other (comments)       headache         Past Medical History       Past Medical History:   Diagnosis Date    Arthritis      Degenerative disc disease      Fibromyalgia           Previous Hospitalization(s)        Past Surgical History:   Procedure Laterality Date    HX HEENT        HX ORTHOPAEDIC             Family History        Family History   Problem Relation Age of Onset    Parkinsonism Father           Social History  Alcohol history: Yes, 1-2 on weekdays, 1-2 on weekends of -  Smoking history: None  Illicit drug history: Not at all  Living arrangement: patient lives with their spouse. Ambulates: Independently      Vital Signs  Visit Vitals  /82 (BP 1 Location: Right upper arm, BP Patient Position: Sitting)   Pulse 97   Temp 97.3 °F (36.3 °C)   Resp 16   Ht 5' 3\" (1.6 m)   Wt 135 lb (61.2 kg)   SpO2 95%   BMI 23.91 kg/m²         Physical Exam  General: No acute distress. Alert. Cooperative. Head: Normocephalic. Atraumatic. Few scattered petechia. Eyes:              Conjunctiva pink. Sclera white with couple ocular petechia . PERRL. Ears:              Hearing grossly intact. Nose:             Septum midline. Mucosa pink. No drainage. Throat: Mucosa pink. Moist mucous membranes with three sores on the cheeks and back of throat. No active bleeding seen. Palate movement equal bilaterally. Neck: Supple. Normal ROM. No stiffness. Respiratory: CTAB. No w/r/r/c.   Cardiovascular: RRR. Normal S1,S2. No m/r/g. Pulses 2+ throughout. GI: + bowel sounds. Mild tenderness to deep palpation over epigastric region. No rebound tenderness or guarding. Nondistended. Extremities: No LE edema. Distal pulses intact. Scattered small petechia bilaterally   Musculoskeletal: Full ROM in all extremities. Skin: Warm, dry. Scattered petechia and bruising throughout. Gisella Jonah Neuro: CN II-XII grossly intact.          Laboratory Data  Recent Results          Recent Results (from the past 8 hour(s))   CBC WITH AUTOMATED DIFF     Collection Time: 07/23/21 11:14 AM   Result Value Ref Range     WBC 5.0 3.6 - 11.0 K/uL     RBC 3.94 3.80 - 5.20 M/uL     HGB 11.9 11.5 - 16.0 g/dL     HCT 37.0 35.0 - 47.0 %     MCV 93.9 80.0 - 99.0 FL     MCH 30.2 26.0 - 34.0 PG     MCHC 32.2 30.0 - 36.5 g/dL     RDW 13.5 11.5 - 14.5 %     PLATELET <3 (LL) 990 - 400 K/uL     MPV ABNORMAL 8.9 - 12.9 FL     NRBC 0.0 0  WBC     ABSOLUTE NRBC 0.00 0.00 - 0.01 K/uL     NEUTROPHILS 57 32 - 75 %     LYMPHOCYTES 29 12 - 49 %     MONOCYTES 10 5 - 13 %     EOSINOPHILS 3 0 - 7 %     BASOPHILS 1 0 - 1 %     IMMATURE GRANULOCYTES 0 0.0 - 0.5 %     ABS. NEUTROPHILS 2.7 1.8 - 8.0 K/UL     ABS. LYMPHOCYTES 1.5 0.8 - 3.5 K/UL     ABS. MONOCYTES 0.5 0.0 - 1.0 K/UL     ABS. EOSINOPHILS 0.2 0.0 - 0.4 K/UL     ABS. BASOPHILS 0.1 0.0 - 0.1 K/UL     ABS. IMM.  GRANS. 0.0 0.00 - 0.04 K/UL     DF SMEAR SCANNED       RBC COMMENTS NORMOCYTIC, NORMOCHROMIC     METABOLIC PANEL, COMPREHENSIVE     Collection Time: 07/23/21 11:14 AM   Result Value Ref Range     Sodium 140 136 - 145 mmol/L     Potassium 3.7 3.5 - 5.1 mmol/L     Chloride 106 97 - 108 mmol/L     CO2 31 21 - 32 mmol/L     Anion gap 3 (L) 5 - 15 mmol/L     Glucose 98 65 - 100 mg/dL     BUN 17 6 - 20 MG/DL     Creatinine 0.68 0.55 - 1.02 MG/DL     BUN/Creatinine ratio 25 (H) 12 - 20       GFR est AA >60 >60 ml/min/1.73m2     GFR est non-AA >60 >60 ml/min/1.73m2     Calcium 9.1 8.5 - 10.1 MG/DL     Bilirubin, total 0.7 0.2 - 1.0 MG/DL     ALT (SGPT) 24 12 - 78 U/L     AST (SGOT) 30 15 - 37 U/L     Alk. phosphatase 105 45 - 117 U/L     Protein, total 7.7 6.4 - 8.2 g/dL     Albumin 4.2 3.5 - 5.0 g/dL     Globulin 3.5 2.0 - 4.0 g/dL     A-G Ratio 1.2 1.1 - 2.2     SAMPLES BEING HELD     Collection Time: 07/23/21 11:14 AM   Result Value Ref Range     SAMPLES BEING HELD 1SST,1RED       COMMENT           Add-on orders for these samples will be processed based on acceptable specimen integrity and analyte stability, which may vary by analyte.            Imaging      CXR Results  (Last 48 hours)     None          CT Results  (Last 48 hours)     None                Assessment and Plan      Sigifredo Lopez is a 64 y.o. female with a PMHx of Fibromyalgia, DDD, and postivie STEPHEN for RA/Sjogrens/Lupus who is admitted for Immune Thrombocytopenia. Followed by Heme/Onc, s/p Platelet Transfusion (7/23).    Immune Thrombocytopenia: Platelets POA of < 3. History of multiple positive STEPHEN (RA/sjogrens/lupus). Three days of increased bruising and petechia, dark stools. Heme/Onc and GI consulted. - Follow up Heme/Onc consult, appreciate recs   - Platelet transfusion 7/23   - Started on Decadron 40mg daily (Day 1 of 4)   - Follow up labs for HIV, Hep B, Hep C, LD. Peripheral smear. B12/Folate. - Follow up PT, PTT, Haptoglobin, Fibrinogen.   - Follow up Tick studies (rickettsia, lyme, ehrlichiosis)  - Daily CMP / CBC     GI Bleed: History of dark stools x1 day and small amount of BRBPR in the setting of ITP. Patient does have history of hemorrhoids. Consulted GI.   - FOBT  - daily CBC  - f/u GI consult, appreciate recs. Plan for possible outpatient EGD and colonoscopy once platelet count resolves.      Fibromyalgia: chronic, stable  - Continue home Tizanidine 4mg nightly for muscle spasms     Degenerative Disc Disease: Chronic, stable     Insomnia: chronic stable   - continue home Melatonin nightly     GERD: chronic, history of H. Pylori x5 years ago. No home treatment now. 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 Managed now with chiropractics and supplements  - Holding home supplements (Vit C, glucosamine chonroitin, Turmeric, Omega 3)        FEN/GI - Regular diet. -  Activity - Ambulate with assistance  DVT prophylaxis - None indicated at this time  GI prophylaxis - Protonix  Fall prophylaxis - Not indicated at this time. Disposition - Admit to Medical. Plan to d/c to Home. Consulting -  Code Status - DNR. Discussed with patient / caregivers.   Next of Kin Name and [address] Ahumada,  Spouse - [phone]     Patient [address] will be discussed with Dr. Gerard Jim.     12:13 PM, 21  Rosa Maria Barclay MD  Family Medicine Resident   PGY1          For Billing         Chief Complaint   Patient presents with   Hermenia Hair   [facility] Problems  Date Reviewed: 3/16/2020     None                     Patient Demographics    Patient Name   Milady Posey   [id number] Legal Sex   Female    1960 Address   [place] [address] Phone   [phone] (Home)   [phone] (Work)   [phone] (Mobile)   CSN:   [id number]   Admit Date: Admit Time Room Bed   2021 10:35  [57122] 01 [[id number]]   Attending Providers    Provider Pager From To   Cruz Nieto DO  21   Jean-Claude Marroquin MD  21 07/26/21   Emergency Contact(s)    Name Relation Home Work Pepper Burnette Spouse [phone]     Utilization Reviews       Gastrointestinal Bleeding, Lower - Care Day 2 (7/24/2021) by Alysha Garcia       Review Entered Review Status   7/24/2021 17:32 Completed      Criteria Review      Care Day: 2 Care Date: 7/24/2021 Level of Care: Inpatient Floor    Guideline Day 2    Level Of Care    (X) Floor    Clinical Status    (X) * Hypotension absent    7/24/2021 17:32:15 EDT by Alysha Garcia      98.7, 108/66, 86, 16, 97%ra    ( ) * Bleeding absent or reduced    Activity    ( ) Activity as tolerated    7/24/2021 17:32:15 EDT by Alysha Garcia      bedrest with bathroom privileges    Routes    (X) * Oral hydration tolerated    (X) * Oral diet tolerated    Interventions    (X) Hgb/Hct    7/24/2021 17:32:15 EDT by Alysha Garcia      10.8, 33.5    * Milestone   Additional Notes   7/24:   physical exam:    General: No distress   Eyes: PERRLA, anicteric sclerae   HENT: Atraumatic with normal appearance of ears and nose; OP with mild bruising in right cheek. Neck: Supple; no visualized JVD   Lymphatic: No cervical, or supraclavicular lymphadenopathy.     Respiratory: CTAB, normal respiratory effort   CV: Normal rate, regular rhythm, no murmurs, no peripheral edema   GI: Soft, nontender, nondistended, no palpable masses, no hepatomegaly, no splenomegaly   MS: Normal gait and station. Digits without clubbing or cyanosis. Skin: Petechia present on bilateral lower extremities. Neuro/Psych: Alert, oriented, appropriate affect, normal judgment/insight       results:    plt 36    glucose 161    hb a1c 5.7        meds:     ml/hr cont.     decadron 40 mg po x1 (daily)    humalog 2 u sc x1 (qid ss)    protonix 40 mg po x2 (bid)        hem onc PN:   Assessment and Recommendations:   # Severe thrombocytopenia consistent with ITP   - Tolerating oral high dose decadron well.  Today is day 2 of 4 for treatment with 40 mg of Decadron.  She did have improvement in PLT to 36 this AM.  Notably, the patient did receive PLT transfusion yesterday.  Will monitor daily for platelet trend.     - The patient will need outpatient follow up with hematology upon acute care discharge.  She wishes to follow at [facility] as it is closest to her residence. - no active bleeding today. No reported melanotic stools today. # Normocytic anemia   - may be a consequence of slow GI bleeding in the setting of severe ITP. Stool guaiac positive. - no acute inpatient intervention required at this point.    - Appreciate GI input.          IM PN:    Subjective:   Pt was seen and examined at bedside. Afebrile and hemodynamically stable. Patient is complaining of a mild occipital headache, but believes it to be secondary to poor sleeping position. No new bleeding, petechia unchanged   a/p:   Thrombocytopenia w/ Autoimmune History (suspect ITP): Platelets POA of < 3 >> 36. S/p Platelet transfusion 7/23.          Gastrointestinal Bleeding, Lower - Care Day 1 (7/23/2021) by Ysabel Magallon       Review Entered Review Status   7/24/2021 17:21 Completed      Criteria Review      Care Day: 1 Care Date: 7/23/2021 Level of Care: Inpatient Floor    Guideline Day 1    Level Of Care    (X) ICU or floor    Clinical Status    (X) * Clinical Indications met    7/24/2021 17:20:02 EDT by Beth Kapoor admit dx Thrombocytopenia    Activity    ( ) Activity as tolerated    7/24/2021 17:20:02 EDT by Ysabel Magallon      bedrest with bathroom privileges    Routes    (X) NPO    (X) IV fluids    7/24/2021 17:21:36 EDT by Ysabel Magallon       ml/hr cont.     ( ) IV medications    7/24/2021 17:21:36 EDT by Ysabel Magallon      decadron 40 mg po x1 (daily)    (X) Possible liquid diet in evening    7/24/2021 17:20:24 EDT by Ysabel Magallon      regular    Interventions    (X) Hgb/Hct    7/24/2021 17:21:36 EDT by Ysabel Magallon      11.9, 37.0 (X) Monitor vital signs and blood counts closely    * Milestone   Additional Notes   7/23:   subjective:    presents to the ED complaining of new onset bruising and bleeding. Bilateral petechia on lower extremities first noted three days ago. Spread to trunk today, and dark stool noted with some bright red blood which prompted her to present to the ER. She has also noticed sores in her mouth with some pink sputum noticed       physical exam:    Constitutional:        General: She is not in acute distress.      Appearance: She is not ill-appearing, toxic-appearing or diaphoretic. HENT:       Head: Normocephalic and atraumatic.       Nose: No rhinorrhea.       Mouth/Throat:       Mouth: Mucous membranes are moist.       Pharynx: No oropharyngeal exudate.       Comments: Right posterior buccal cheek with bruising. Eyes:       General: No scleral icterus.      Pupils: Pupils are equal, round, and reactive to light.       Comments: Mild redness in scant areas of bilateral subconjunctivae. Cardiovascular:       Heart sounds: Normal heart sounds. No friction rub. No gallop.     Pulmonary:       Effort: Pulmonary effort is normal.       Breath sounds: Normal breath sounds. No wheezing or rales. Abdominal:       General: Bowel sounds are normal. There is no distension.       Palpations: Abdomen is soft.       Tenderness: There is no abdominal tenderness. There is no guarding. Musculoskeletal:       Right lower leg: No edema.       Left lower leg: No edema. Skin:      Coloration: Skin is not jaundiced.       Findings: Bruising and rash (petechial rash bilateral lower legs) present. Neurological:       General: No focal deficit present.       Mental Status: She is oriented to person, place, and time.     Psychiatric:          Mood and Affect: Mood normal.          Behavior: Behavior normal.         results:    plt <3    folate 55.1    haptoglobin <8       1u platelet transfusion        vitals:    98.8 110/72    65    16    94%RA        IM a/p:   Immune Thrombocytopenia: Platelets POA of < 3. History of multiple positive STEPHEN (RA/sjogrens/lupus). Three days of increased bruising and petechia, dark stools. Heme/Onc and GI consulted. - Follow up Heme/Onc consult, appreciate recs    - Platelet transfusion 7/23    - Started on Decadron 40mg daily (Day 1 of 4)    - Follow up labs for HIV, Hep B, Hep C, LD. Peripheral smear. B12/Folate. - Follow up PT, PTT, Haptoglobin, Fibrinogen. - Follow up Tick studies (rickettsia, lyme, ehrlichiosis)   - Daily CMP / CBC       GI Bleed: History of dark stools x1 day and small amount of BRBPR in the setting of ITP. Patient does have history of hemorrhoids.  Consulted GI.    - FOBT   - daily CBC   - f/u GI consult, appreciate recs. Plan for possible outpatient EGD and colonoscopy once platelet count resolves.       Fibromyalgia: chronic, stable   - Continue home Tizanidine 4mg nightly for muscle spasms       Degenerative Disc Disease: Chronic, stable       Insomnia: chronic stable    - continue home Melatonin nightly       GERD: chronic, history of H. Pylori x5 years ago. No home treatment now. - Start Protonix 40mg BID       Non-differentiated Connective Tissue Disease: No current medications, history of Plaquenil use. Managed now with chiropractics and supplements   - Holding home supplements (Vit C, glucosamine chonroitin, Turmeric, Omega 3)       FEN/GI - Regular diet. -   Activity - Ambulate with assistance   DVT prophylaxis - None indicated at this time   GI prophylaxis - Protonix            GI consult:    Assessment and PlanThrombocytopenia   Petechiae   Bruising   Oral bleeding   Blood in stool   I suspect the same oozing of blood we're seeing from the oral mucosa and skin is present in the intestine. I don't see any evidence of acute hemodynamically significant GI blood loss.    I have recommended that after her platelet count improves we arrange EGD and colonoscopy. She has indicated an unwillingness to accomplish those procedures but stated she would think about it. I will have my office offer those procedures to her as an outpatient. If she remains hospitalized next week and desires to proceed please re-consult. GI will sign off for now.       hematology consult:    Assessment and Recommendations:   Diagnosis   ·Severe thrombocytopenia ([facility] [address]   Discussed with patient that her presentation is most consistent with immune thrombocytopenia.  However, we discussed that there is no clear serologic test to confirm ITP.  We discussed that treatment is typically administered after other severe thrombocytopenia causes have been relatively excluded.  Her total bilirubin is normal as well as LDH.  Also, she is without anemia.  With such severe thrombocytopenia with a platelet count less than 5 we have recommended platelet transfusion in the presence of her melenic stools but also due to the risk of spontaneous intracranial bleeding.  Patient is without any neuro symptoms to warrant any CNS imaging at present though we reviewed alarm symptoms in case she were to develop. We will start high-dose dexamethasone 4 mg p.o. daily x4 as standard therapy for ITP.  No plans to start any IVIG at present although may be used if she does not improve on her thrombocytopenia. She is without any other clear systemic complaint to me.  We will check HIV, hepatitis virus serology, vitamin B12. Cannot exclude any autoimmune etiology.  One may seek records on her prior rheumatology history.    I discussed with patient that weight anticipate improvement in her platelet count in the next 1 to 2 days.  If no improvement then there may be a consideration of IVIG employment or consideration of other causes.  However, she seems to be presenting with a typical ITP presentation.       ·Melena   Consider GI consultation.  However, with severe thrombocytopenia platelet transfusion and steroid therapy are at the immediate forefront of her care.  If she seems to have active bleeding and reasonable to contact GI.       ·Low serum haptoglobin   Unclear meaning of low haptoglobin in the presence of a normal LDH as well as normal T bilirubin.  Patient is without any known anemia at present.  Unclear role of testing for any direct Angie test without any signs of hemolytic anemia. This does not seem to be consistent with TTP with normal LDH and no schistocytosis as well as no anemia.       ·High risk medication use   Today, I counseled patient on the risks of systemic steroids. She provided verbal consent. We discussed that in the short-term she is at risk for glucose metabolism disturbance, acute weakness, risk of steroid-induced psychosis, section, clotting. I recommended to her that she received GI prophylaxis in setting of her melena but also while on high-dose steroids since she is at potential risk for the development of stress ulcers.         She should start Dexamethasone 40mg PO daily on Days 1-4.  She will          need outpatient follow-up with our clinic at [facility] upon          improvement of her condition and hospital discharge.      Gastrointestinal Bleeding, Lower - Clinical Indications for Admission to Inpatient Care by Sher Sinha       Review Entered Review Status   7/24/2021 17:18 Completed      Criteria Review      Clinical Indications for Admission to Inpatient Care    Most Recent : Sher Sinha Most Recent Date: 7/24/2021 17:18:32 EDT    (X) Admission is indicated for  1 or more  of the following  (1) (2) (3) (4) (5) (6):       (X) Ongoing active bleeding (eg, decreasing hematocrit)       7/24/2021 17:18:32 EDT by Sher Sinha         hct 37 7/23, 33.5 7/24     (X) Other Indication: platelet <3      Entered 7/24/2021 17:18:32 EDT by Lauryn Cadet <3

## 2021-07-26 NOTE — MED STUDENT NOTES
*ATTENTION:  This note has been created by a medical student for educational purposes only. Please do not refer to the content of this note for clinical decision-making, billing, or other purposes. Please see attending physicians note to obtain clinical information on this patient. *         [address] [facility]    Office [phone]  Fax [phone]     Assessment and [address] [place] [address] Avenue is a 64 y.o. female with a PMHx of Fibromyalgia, DDD, and +STEPHEN for RA/Sjogrens/Lupus who is admitted for Immune Thrombocytopenia, improving s/p platelet transfusion 7/23 and with Decadron 40mg x4 as of today. Ready for discharge today. 24 Hour Events: No acute events overnight.     Thrombocytopenia w/ Autoimmune History (suspect ITP): Platelets POA of < 3 >> 95. History of multiple +STEPHEN (RA/sjogrens/lupus). S/p Platelet transfusion 7/23. Denies any new petechiae, bruising, melena, pink sputum.  - Heme/Onc following   - Finish Decadron 40mg daily today (Day 4 of 4)   - NPH 10 units daily in the setting of Decadron  - Hypoglycemia protocols ordered  - Follow up Tick studies (rickettsia, lyme, ehrlichiosis)  - Daily CMP / CBC     Normocytic anemia likely 2/2 slow GI Bleed: Likely consequence of slow GI bleeding in the setting of severe ITP per Heme/Onc. + FOBT. GI suspects oozing of blood from intestine. Pt denies any melena or pink sputum.  - GI recommends for EGD and colonoscopy after platelet count improves  - CBC daily  - IVF d/c'ed      Headache: Pt reported headache on 7/24 in the setting of thrombocytopenia. Declined head CT, acknowledged risks. Mild HA today, has relief with Fioricet. - Continue to monitor  - Continue Fioricet PRN     Low serum haptoglobin: Unclear meaning in setting of normal LDH and Total bilirubin. No schistocytes.  No signs of hemolytic anemia.  - Heme/Onc following     Fibromyalgia: chronic, stable  - Continue home Tizanidine 4mg nightly for muscle spasms     Degenerative Disc Disease: Chronic, stable     Insomnia: chronic stable   - continue home Melatonin nightly     GERD: chronic, history of H. Pylori x5 years ago. No home treatment now. 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 Managed now with chiropractics and supplements  - Holding home supplements (Vit C, glucosamine chonroitin, Turmeric, Omega 3)        FEN/GI - Regular diet.   Activity - Ambulate with assistance  DVT prophylaxis - None indicated at this time  GI prophylaxis - Protonix 40mg BID  Fall prophylaxis - Not indicated at this time. Disposition - Medical. Plan to d/c to Home. Code Status - DNR. Discussed with patient / caregivers. Next of Kin Name and Gabino Pierce- Taina Bernstein- [phone]    I appreciate the opportunity to participate in the care of this patient,  Rayna Felix, Medical Student    Attending note to follow    Subjective / Objective     Subjective  Patient doing well. No new petechiae/bruising. +BM, denies melena. Having mild HA which is relieved by Fioricet. Reports some reflux sx. Denies any lightheadedness. Denies CP, SOB, abd pain. Objective  VS: Blood pressure 109/68, pulse (!) 51, temperature 97.7 °F (36.5 °C), resp. rate 16, height 5' 3\" (1.6 m), weight 135 lb (61.2 kg), SpO2 98 %.    O2 Device: None (Room air)     Date 07/25/21 0700 - 07/26/21 0659 07/26/21 0700 - 07/27/21 0659   Shift [id number] 1900-0659 24 Hour Total 3406-8482 0041-1114 24 Hour Total   INTAKE   P.O.  225 225        P.O.  225 225      Shift Total(mL/kg)  225(3.7) 225(3.7)      OUTPUT   Urine(mL/kg/hr)           Urine Occurrence(s) 1 x 2 x 3 x      Stool           Stool Occurrence(s) 1 x  1 x      Shift Total(mL/kg)         NET  225 225      Weight (kg) 61.2 61.2 61.2 61.2 61.2 61.2       CBC:  Recent Labs     07/26/21  0519 07/25/21  0311 07/24/21  0417   WBC 9.9 13.8* 6.6   HGB 9.9* 9.7* 10.8*   HCT 30.4* 30.3* 33.5*   PLT 95* 50* 36*       Metabolic Panel:  Recent Labs     07/25/21  0311 07/24/21  0417 07/23/21  1352 07/23/21  1114    140  --  140   K 3.8 4.0  --  3.7   * 109*  --  106   CO2 27 27  --  31   BUN 19 19  --  17   CREA 0.64 0.60  --  0.68   * 161*  --  98   CA 8.9 8.5  --  9.1   ALB 3.7 3.6  --  4.2   ALT 21 22  --  24   INR  --   --  1.1  --        Physical Exam    Vitals Reviewed. General Oriented to person, place, and time. Well-nourished. No acute distress. HENT Head Normocephalic and atraumatic. Eyes Conjunctivae are normal, no discharge. No scleral icterus. Cardio Normal rate, regular rhythm. Exam reveals no gallop and no friction rub. No murmur heard. No chest wall tenderness. Pulmonary Effort normal and breath sounds normal. No respiratory distress. No wheezes, no rales. Abdominal Soft. Bowel sounds normal. No distension. No tenderness.  Deferred. Extremities No edema of lower extremities. No tenderness. Distal pulses intact. Neurological Alert and oriented to person, place, and time. Dermatology Scattered petechiae over bilateral shins and bilateral arms, similar to previous, none new observed.     Psychiatric Affect and judgment normal.        For Billing   Chief Complaint   Patient presents with   [facility] Problems  Date Reviewed: 7/23/2021        Codes Class Noted POA    * (Principal) [facility] ([facility] ICD-10-CM: D69.6  ICD-9-CM: 287.5  7/23/2021 Unknown        Melena ICD-10-CM: K92.1  ICD-9-CM: 578.1  7/23/2021 Unknown        Low serum haptoglobin ICD-10-CM: R77.8  ICD-9-CM: 790.6  7/23/2021 Unknown        High risk medication use ICD-10-CM: Z79.899  ICD-9-CM: V58.69  7/23/2021 Unknown        Acute ITP ([facility] ICD-10-CM: D69.3  ICD-9-CM: 287.31  7/23/2021 Unknown        Petechiae ICD-10-CM: R23.3  ICD-9-CM: 782.7  7/23/2021 Unknown

## 2021-07-26 NOTE — PROGRESS NOTES
7/26/2021   CARE MANAGEMENT NOTE:  CM reviewed EMR and handoff received from previous  Smiley Weber). Pt was admitted with thrombocytopenia with autoimmune hx, anemia, Fibromyalgia, DDD, non-differentiated connective tissue disease. Reportedly, pt resides with her  Radha Samaniego ([phone]). RUR 11%    Transition Plan of Care:  1. Pt to return home without any homecare needs  2. Outpt f/u  3.  will transport pt home    No further post discharge needs indicated at this time.   Kendell

## 2021-07-26 NOTE — PROGRESS NOTES
[facility] [place] [facility]  [address] [facility], [address] [facility] [address] [facility] [place]: [phone]  F: [phone] Patient ID  Name: Pillo Chavarria  YOB: 1960  MRN: [id number]  Referring Provider:   No referring provider defined for this encounter. Primary Care Provider:   Michelle Elias NP       HEMATOLOGY/MEDICAL ONCOLOGY CONSULTATION FOLLOW-UP PROGRESS NOTE   Date of Visit: 07/26/21  Reason for Visit:     Chief Complaint   Patient presents with    Bleeding/Bruising     Subjective:   Pillo Chavarria is a 64 y.o. F with presumed ITP responding to oral dexamethasone. She rpeorts improvement in her petechiae. She reports that she is without any bleeding issues today. She reports some mild nausea from what she thinks is related to steroid use. She is set to receive her 4th and final day of dexamethasone this morning and in fact her nurse reports having the medicine to administer during the visit. She denies any shortness of breath. She notes that she has had some dark stools but this mostly has resolved. She wonders if her non-specific connective tissue disease history has put her at risk for immune thrombocytopenia. Review of Systems   Constitutional: Negative for fever and malaise/fatigue. HENT: Negative for nosebleeds and sore throat. Eyes: Negative for pain and discharge. Respiratory: Negative for hemoptysis and shortness of breath. Cardiovascular: Negative for chest pain and leg swelling. Gastrointestinal: Positive for melena (resolving). Negative for blood in stool. Genitourinary: Negative for dysuria and hematuria. Skin: Positive for rash (petechiae improving). Negative for itching. Neurological: Negative for tremors and seizures. Endo/Heme/Allergies: Bruises/bleeds easily. Psychiatric/Behavioral: Negative for depression. The patient is not nervous/anxious.           Past Medical History:   Diagnosis Date    Arthritis     Degenerative disc disease     Fibromyalgia       Past Surgical History:   Procedure Laterality Date    HX HEENT      HX ORTHOPAEDIC        Social History     Tobacco Use    Smoking status: Never Smoker    Smokeless tobacco: Never Used   Substance Use Topics    Alcohol use:  Yes     Alcohol/week: 1.7 standard drinks     Types: 2 Glasses of wine per week      Family History   Problem Relation Age of Onset    Parkinsonism Father      Current Facility-Administered Medications   Medication Dose Route Frequency    insulin NPH (NOVOLIN N, HUMULIN N) injection 10 Units  10 Units SubCUTAneous DAILY    glucose chewable tablet 16 g  4 Tablet Oral PRN    dextrose (D50W) injection syrg 12.5-25 g  25-50 mL IntraVENous PRN    glucagon (GLUCAGEN) injection 1 mg  1 mg IntraMUSCular PRN    insulin lispro (HUMALOG) injection   SubCUTAneous AC&HS    butalbital-acetaminophen-caffeine (FIORICET, ESGIC) -40 mg per tablet 1 Tablet  1 Tablet Oral Q6H PRN    0.9% sodium chloride infusion 250 mL  250 mL IntraVENous PRN    pantoprazole (PROTONIX) tablet 40 mg  40 mg Oral BID    sodium chloride (NS) flush 5-40 mL  5-40 mL IntraVENous Q8H    sodium chloride (NS) flush 5-40 mL  5-40 mL IntraVENous PRN    acetaminophen (TYLENOL) tablet 650 mg  650 mg Oral Q6H PRN    Or    acetaminophen (TYLENOL) suppository 650 mg  650 mg Rectal Q6H PRN    polyethylene glycol (MIRALAX) packet 17 g  17 g Oral DAILY PRN    tiZANidine (ZANAFLEX) tablet 4 mg  4 mg Oral QHS PRN    melatonin (rapid dissolve) tablet 10 mg  10 mg Oral QHS PRN    diphenhydrAMINE (BENADRYL) capsule 50 mg  50 mg Oral Q6H PRN      Allergies   Allergen Reactions    Doxycycline Other (comments)     headache    Sulfa (Sulfonamide Antibiotics) Other (comments)     headache          Objective:     Visit Vitals  /67 (BP 1 Location: Right upper arm, BP Patient Position: Sitting)   Pulse (!) 53   Temp 98.6 °F (37 °C)   Resp 17   Ht 5' 3\" (1.6 m)   Wt 135 lb (61.2 kg)   SpO2 98%   BMI 23.91 kg/m²     Physical Exam  Constitutional:  No acute distress. Non-toxic appearance. Non-diaphoretic. HENT: Normocephalic and atraumatic head. Eyes: No scleral icterus. Conjunctivae normal.   Cardiovascular: Heart sounds: Normal heart sounds. No friction rub. No gallop. No pitting/trace edema. Pulmonary: Effort: Pulmonary effort is normal. No respiratory distress. Breath sounds: No wheezing, rhonchi or rales. Abdominal:  General: Bowel sounds are normal. Palpations: Abdomen is soft. Tenderness: There is no abdominal tenderness. No guarding. Skin: Rash present: Mild,faded petechiae remain on lower legs. No jaundice. Musculoskeletal: No temporal muscle wasting on gross inspection. No myalgias on palpation. Neurological: Alert and oriented to person, place, and time. Mental status is at baseline. Normal gait. Psychiatric:  Mood normal. Normal speech rate. Results:     Lab Results   Component Value Date/Time    WBC 9.9 07/26/2021 05:19 AM    HGB 9.9 (L) 07/26/2021 05:19 AM    HCT 30.4 (L) 07/26/2021 05:19 AM    PLATELET 95 (L) 76/56/7139 05:19 AM    MCV 93.3 07/26/2021 05:19 AM    ABS. NEUTROPHILS 6.9 07/26/2021 05:19 AM     Lab Results   Component Value Date/Time    Sodium 141 07/26/2021 05:19 AM    Potassium 4.0 07/26/2021 05:19 AM    Chloride 110 (H) 07/26/2021 05:19 AM    CO2 28 07/26/2021 05:19 AM    Glucose 96 07/26/2021 05:19 AM    BUN 20 07/26/2021 05:19 AM    Creatinine 0.60 07/26/2021 05:19 AM    GFR est AA >60 07/26/2021 05:19 AM    GFR est non-AA >60 07/26/2021 05:19 AM    Calcium 8.4 (L) 07/26/2021 05:19 AM    Glucose (POC) 112 07/26/2021 06:32 AM     Lab Results   Component Value Date/Time    Bilirubin, total 0.6 07/25/2021 03:11 AM    ALT (SGPT) 21 07/25/2021 03:11 AM    Alk.  phosphatase 96 07/25/2021 03:11 AM    Protein, total 6.8 07/25/2021 03:11 AM    Albumin 3.7 07/25/2021 03:11 AM    Globulin 3.1 07/25/2021 03:11 AM     Assessment and Recommendations:      Diagnosis    Severe thrombocytopenia ([facility]  -Okay for discharge from Hematology (thrombocytopenia perspective). Most consistent with ITP with good response to steroids (Day#4 of 4 today of HD Dexamethasone 40mg PO daily). -She will see us at [facility] for repeat CBC and clinic visit the following week (offered clinic follow-up this week but she prefers labs alone).  Melena   GI Consulted; follow-up with GI per their discretion.  Normocytic Anemia  -Patient with stable anemia though in the setting of dark stools potentially related to a GI bleed in the setting of severe thrombocytopenia and other signs of bleeding now improved. -Follow-up CBC on Thursday.  -Haptoglobin was low in the setting of normal Hgb, LDH, T Bili and no signs of schistocytosis. Unclear meaning of low haptoglobin.  High risk medication use   Complete Day4 dexamethasone today and no further treatment planned.  I advised her to take OTC PPI or Pepcid for her nausea and GI protection since she was on steroids. Follow-Up:  Outpt Hematology follow-up as scheduled/discussed with patient. She understood where to find our outpatient  Cancer institute.       Signed By:   Lena Wilson MD

## 2021-07-26 NOTE — DISCHARGE SUMMARY
[facility] [address] [place] [address]   Office [phone]  Fax [phone]       Discharge / Transfer / Off-Service Note     Name: Shanon Browne MRN: [id number]  Sex: Female   YOB: 1960  Age: 64 y.o. PCP: Princess Cedeno NP     Date of admission: 7/23/2021  Date of discharge/transfer: 7/26/2021    Attending physician at admission: Marcy Perry. Attending physician at discharge/transfer: Roel Rivera MD  Resident physician at discharge/transfer: Delvis Vizcarra MD     Consultants during hospitalization  IP CONSULT TO [facility]     Admission diagnoses   Thrombocytopenia ([facility] [address] [D69.6]    Recommended follow-up after discharge    - PCP - 7/29 @10:00am  - Nicolle (heme/onc) - 8/4 @ 2:30       Things to follow up on with PCP:   - Platelet count (CBC), Tick studies   - Thrombocytopenia (any further melena, bruising, etc)   ----------------------------------------------------------------------------------------------------------------------------  The patient was well enough to be discharged from the hospital. However, because they were inpatient in a hospital, they are at greater risk of having been exposed to the coronavirus. PLEASE stay inside and self-quarantine for 14 days to prevent further spread of the coronavirus.  ------------------------------------------------------------------------------------------------------------------    Medication Changes:  START   - Protonix 40mg daily x7 days after discharge for GERD symptoms post steroid course. - Fioricet tablet every 6 hours as needed for headaches (total 15 tablets)      No other changes were made to your medications, please take all your other home medicines as previously prescribed.      History of Present Illness    As per admitting provider, Dr. Edmondson Se:     Shanon Browne is a 64 y.o. female with PMHx of Fibromyalgia, non-differentiated connective tissue disease, multiple positive STEPHEN (rheumatoid, sjogrens, lupus)  and DDD who presents to the ED complaining of new onset bruising and bleeding. Bilateral petechia on lower extremities first noted three days ago. Spread to trunk today, and dark stool noted with some bright red blood which prompted her to present to the ER. She has also noticed sores in her mouth with some pink sputum noticed. Denies epistaxis and vomiting. Denies headache, fatigue, dizziness, chest pain. States this is the first time anything like this has happened. 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     Denies heparin exposure, recent infections, tonic water consumption, or quinine in supplements. [facility]    [place] 89 a 64 y. o. female with a PMHx of Fibromyalgia, DDD, and postivie STEPHEN for RA/Sjogrens/Lupus who was admitted for Thrombocytopenia (Platelets < 3) in the setting of significant autoimmune history. She was given a transfusion of platelets on the first night and completed a x4 day course of Decadron. Discharged home stable with close follow up with Heme/Onc.      Thrombocytopenia w/ Autoimmune History (suspect ITP): Platelets POA of < 3, >> 95. History of multiple positive STEPHEN (RA/sjogrens/lupus). S/p Platelet transfusion 7/23. - Complted Decadron 40mg daily 4 day course this morning. F/u HemeOn outpatient. - Follow up Tick studies (rickettsia, lyme, ehrlichiosis)     Normocytic anemia likely 2/2 slow GI Bleed: Likely consequence of slow GI bleeding in the setting of severe ITP per Heme/Onc. + FOBT. GI suspects oozing of blood from intestine.  - GI recommends for EGD and colonoscopy after platelet count improves     Headache: Pt reported headache on 7/24 in the setting of thrombocytopenia. Declined head CT, acknowledged risks. She reports that Fioricet helped with symptoms yesterday. Pt reports mild headache but managed with Fioricet.  Recommended Head CT given increased bleeding risk, however patient declined. - home with Fioricet PRN     Low serum haptoglobin: Unclear meaning in setting of normal LDH and Total bilirubin. No schistocytosis. No signs of hemolytic anemia. F/u outpatient     Fibromyalgia: chronic, stable  - Continue home Tizanidine 4mg nightly for muscle spasms     Degenerative Disc Disease: Chronic, stable     Insomnia: chronic stable   - continue home Melatonin nightly     GERD: chronic, history of H. Pylori x5 years ago. No home treatment now. - Protonix 40mg BID x7 days post steroid course.      Non-differentiated Connective Tissue Disease: No current medications, history of Plaquenil use. Managed now with chiropractics and supplements  - Holding home supplements (Vit C, glucosamine chonroitin, Turmeric, Omega 3)           Physical exam at discharge:    Vitals Reviewed. Patient Vitals for the past 12 hrs:   Temp Pulse Resp BP SpO2   07/26/21 0734 98.6 °F (37 °C) (!) 53 17 118/67 98 %   07/25/21 2329 97.7 °F (36.5 °C) (!) 51 16 109/68 98 %      General Oriented to person, place, and time and well-developed. Appears well-nourished, no distress. Not diaphoretic. HENT Head Normocephalic and atraumatic. Eyes Conjunctivae showing single petechia, unchanged from admission   Nose Nose normal, clear turbinates. Oral Oropharynx is clear and moist, lesions healing with no further pink sputum. Neck No thyromegaly present. No cervical adenopathy. Cardio Normal rate, regular rhythm. Exam reveals no gallop and no friction rub. No murmur heard. No chest wall tenderness. Pulmonary Effort normal and breath sounds normal. No respiratory distress. No wheezes, no rales. Abdominal Soft. Bowel sounds normal. No distension. No tenderness.  Deferred. Extremities No edema of lower extremities. No tenderness. Distal pulses intact. Neurological Alert and oriented to person, place, and time. Dermatology Skin is warm and dry.  Scattered petechia and bruising slightly improved since admission. Psychiatric Affect and judgment normal.        Condition at discharge: Stable. Labs  Recent Labs     07/26/21  0519 07/25/21 0311 07/24/21 0417   WBC 9.9 13.8* 6.6   HGB 9.9* 9.7* 10.8*   HCT 30.4* 30.3* 33.5*   PLT 95* 50* 36*     Recent Labs     07/26/21  0519 07/25/21 0311 07/24/21 0417    141 140   K 4.0 3.8 4.0   * 111* 109*   CO2 28 27 27   BUN 20 19 19   CREA 0.60 0.64 0.60   GLU 96 131* 161*   CA 8.4* 8.9 8.5     Recent Labs     07/25/21  0311 07/24/21  0417 07/23/21  1114   ALT 21 22 24   AP 96 101 105   TBILI 0.6 0.5 0.7   TP 6.8 7.1 7.7   ALB 3.7 3.6 4.2   GLOB 3.1 3.5 3.5     Recent Labs     07/26/21  0632 07/25/21  2103 07/25/21  1615 07/25/21  1106 07/25/21  0630 07/24/21  1104 07/23/21  1352   INR  --   --   --   --   --   --  1.1   PTP  --   --   --   --   --   --  11.0   APTT  --   --   --   --   --   --  29.4   GLUCPOC 112 148* 195* 136* 112   < >  --     < > = values in this interval not displayed. Micro:  No results found for: CULT    Imaging:  No results found.     Procedures / Diagnostic Studies  ·     Chronic diagnoses   Problem List as of 7/26/2021 Date Reviewed: 7/23/2021        Codes Class Noted - Resolved    * (Principal) Severe thrombocytopenia ([facility] [address] ICD-10-CM: D69.6  ICD-9-CM: 287.5  7/23/2021 - Present        Melena ICD-10-CM: K92.1  ICD-9-CM: 578.1  7/23/2021 - Present        Low serum haptoglobin ICD-10-CM: R77.8  ICD-9-CM: 790.6  7/23/2021 - Present        High risk medication use ICD-10-CM: Z79.899  ICD-9-CM: V58.69  7/23/2021 - Present        Acute ITP ([facility] [address] ICD-10-CM: D69.3  ICD-9-CM: 287.31  7/23/2021 - Present        Petechiae ICD-10-CM: R23.3  ICD-9-CM: 782.7  7/23/2021 - Present        Fibromyalgia ICD-10-CM: M79.7  ICD-9-CM: 729.1  12/6/2013 - Present        DDD (degenerative disc disease), cervical ICD-10-CM: M50.30  ICD-9-CM: 722.4  12/6/2013 - Present        DDD (degenerative disc disease), lumbar ICD-10-CM: M51.36  ICD-9-CM: 722.52  12/6/2013 - Present              Current Discharge Medication List      START taking these medications    Details   pantoprazole (PROTONIX) 40 mg tablet Take 1 Tablet by mouth daily for 7 days. Qty: 7 Tablet, Refills: 0  Start date: 7/26/2021, End date: 8/2/2021      butalbital-acetaminophen-caffeine (FIORICET, ESGIC) -40 mg per tablet Take 1 Tablet by mouth every six (6) hours as needed for Headache for up to 15 doses. Qty: 15 Tablet, Refills: 0  Start date: 7/26/2021         CONTINUE these medications which have NOT CHANGED    Details   tiZANidine (Zanaflex) 4 mg tablet Take 4 mg by mouth nightly. Mariza Skipper: Take one capsule by mouth twice daily      cholecalciferol, vitamin D3, (Vitamin D3) 50 mcg (2,000 unit) tab Take 2,000 Units by mouth two (2) times a day. calcium/mag/vitamin D2/Zn/min (ADAM-MAG ZINC II PO) Take 1 Tablet by mouth two (2) times a day. Omega-3 Fatty Acids 60- mg cpDR Take 1 Caplet by mouth two (2) times a day. multivitamin (ONE A DAY) tablet Take 1 Tab by mouth daily. turmeric 400 mg cap Take 1 Caplet by mouth two (2) times a day. B.infantis-B.ani-B.long-B.bifi (PROBIOTIC 4X) 10-15 mg TbEC Take 1 Caplet by mouth two (2) times a day. ascorbic acid, vitamin C, (VITAMIN C) 500 mg tablet Take 1,000 mg by mouth two (2) times a day. glucosamine-chondroitin (ARTHX) 500-400 mg cap Take 1 Capsule by mouth two (2) times a day. Diet:  Regular diet.     Activity:  As tolerated     Disposition:  Home    Discharge instructions to patient/family  Please seek medical attention for any new or worsening symptoms particularly fever, chest pain, shortness of breath, abdominal pain, nausea, vomiting    Follow up plans/appointments  Follow-up Information     Follow up With Specialties Details Why Contact Info    Anthony Laboy MD Family Medicine Go on 7/29/2021 VIRTUAL appointment for [facility] [place] f/u for immune thrombocytopenia at 7/29 at 10am with Dr. Noble Cue [address]  [phone]      Boy De MD Hematology and Oncology Go on 8/4/2021 2:30 PM - Hospital follow up outpatient with Hematology/Oncology for immune thrombocytopenia [facility]  [address]  [address] [facility]  [phone]      Yogesh Webb MD Gastroenterology Call Call for recommended endoscopy / colonoscopy given melena episodes  [address]  [phone]      Estefani Aguilera, [address] [place] [address]  [address]  [address]  [phone]             Lynn Portillo MD  Family Medicine Resident       For Billing    Chief Complaint   Patient presents with   [facility] Problems  Date Reviewed: 7/23/2021        Codes Class Noted POA    * (Principal) Severe thrombocytopenia ([facility] [address] ICD-10-CM: D69.6  ICD-9-CM: 287.5  7/23/2021 Unknown        Melena ICD-10-CM: K92.1  ICD-9-CM: 578.1  7/23/2021 Unknown        Low serum haptoglobin ICD-10-CM: R77.8  ICD-9-CM: 790.6  7/23/2021 Unknown        High risk medication use ICD-10-CM: Z79.899  ICD-9-CM: V58.69  7/23/2021 Unknown        Acute ITP ([facility] [address] ICD-10-CM: D69.3  ICD-9-CM: 287.31  7/23/2021 Unknown        Petechiae ICD-10-CM: R23.3  ICD-9-CM: 782.7  7/23/2021 Unknown

## 2021-07-26 NOTE — PROGRESS NOTES
Bedside shift change report given to Jamison  (oncoming nurse) by Liliana Watkins (offgoing nurse). Report included the following information SBAR, Kardex, Intake/Output, MAR and Recent Results.

## 2021-07-26 NOTE — DISCHARGE INSTRUCTIONS
HOME DISCHARGE INSTRUCTIONS    Mimi Glynn / [id number] : 1960    Admission date: 2021 Discharge date: 2021 5:43 AM     Please bring this form with you to show your care provider at your follow-up appointment. Primary care provider:  Lisa Escobedo NP    Discharging provider:  Dinh Lee MD - Family Medicine Resident  Nora Maldonado MD - Family Medicine Attending      You have been admitted to the hospital with the following diagnoses:    ACUTE DIAGNOSES:  Thrombocytopenia ([facility] [address] [D69.6]    . . . . . . . . . . . . . . . . . . . . . . . . . . . . . . . . . . . . . . . . . . . . . . . . . . . . . . . . . . . . . . . . . . . . . . . .   You are well enough to be discharged from the hospital. However, because you were inpatient in a hospital, you are at greater risk of having been exposed to the coronavirus. PLEASE stay inside and self-quarantine for 14 days to prevent further spread of the coronavirus. . . . . . . . . . . . . . . . . . . . . . . . . . . . . . . . . . . . . . . . . . . . . . . . . . . . . . . . . . . . . . . . . . . . . . . . . MEDICATION CHANGES    START:   - Protonix 40mg tablet once per day for x7 days, for symptoms of GERD associated with recent steroid course. - Fioricet tablet every 6 hours as needed for headaches (total 15 tablets)     No other changes were made to your medications, please take all your other home medicines as previously prescribed. . . . . . . . . . . . . . . . . . . . . . . . . . . . . . . . . . . . . . . . . . . . . . . . . . . . . . . . . . . . . . . . . . . . . . . . Sidra Lamb      FOLLOW-UP CARE RECOMMENDATIONS:    Appointments  Follow-up Information     Follow up With Specialties Details Why Contact Info    Satya Ward MD Family Medicine Go on 2021 VIRTUAL appointment for hospital f/u for immune thrombocytopenia at  at 10am with Dr. Gena Tripathi [address] [phone]      [facility], MD Hematology and Oncology Go on 8/4/2021 2:30 PM - Hospital follow up outpatient with Hematology/Oncology for immune thrombocytopenia [facility]  [address] [facility]  [phone]      Iraj Gupta MD Gastroenterology Call Call for recommended endoscopy / colonoscopy given melena episodes  [facility] [facility]  [phone]             Follow-up tests needed:   - CBC to monitor platelets and hemoglobin   - Possible EGD and colonoscopy with the gastroenterologist to evaluate the blood in your stools during admission     Pending test results: At the time of your discharge the following test results are still pending: Studies for possible Tickborn illness   Please make sure you review these results with your outpatient follow-up provider(s). DIET/what to eat:  Regular Diet    ACTIVITY:  Activity as tolerated - use caution to prevent falls and injuries     Wound care: None    Equipment needed:  None    Specific symptoms to watch for: chest pain, shortness of breath, fever, chills, nausea, vomiting, diarrhea, change in mentation, falling, weakness, bleeding. What to do if new or unexpected symptoms occur? If you experience any of the above symptoms (or should other concerns or questions arise after discharge) please call your primary care physician. Return to the emergency room if you cannot get hold of your doctor. · It is very important that you keep your follow-up appointment(s). · Please bring discharge papers, medication list (and/or medication bottles) to your follow-up appointments for review by your outpatient provider(s). · Please check the list of medications and be sure it includes every medication (even non-prescription medications) that your provider wants you to take. · It is important that you take the medication exactly as they are prescribed.    · Keep your medication in the bottles provided by the pharmacist and keep a list of the medication names, dosages, and times to be taken in your wallet. · Do not take other medications without consulting your doctor. · If you have any questions about your medications or other instructions, please talk to your nurse or care provider before you leave the hospital.     Information obtained by:     I understand that if any problems occur once I am at home I am to contact my physician. These instructions were explained to me and I had the opportunity to ask questions. I understand and acknowledge receipt of the instructions indicated above. Physician's or R.N.'s Signature                                                                  Date/Time                                                                                                                                              Patient or Representative Signature                                                          Date/Time    Patient Education        Thrombocytopenia: Care Instructions  Your Care Instructions     Thrombocytopenia is a low number of platelets in the blood. Platelets are the cells that help blood clot. If you don't have enough of them, your blood cannot clot well. So it is harder to stop bleeding. You may have low platelets because your bone marrow does not make them. Or your body's defenses (immune system) may destroy them. Having an enlarged spleen can also reduce the number of platelets in your blood. This is because they can get trapped in the enlarged spleen. Some diseases or medicines may also cause low platelets. But platelets may go back to normal levels if the disease is treated or the medicine is stopped. You may not need treatment if your problem is mild. If you do need treatment, you may have platelets added to your blood.  Or you may get medicine to stop the loss of platelets or help your body make them. Follow-up care is a key part of your treatment and safety. Be sure to make and go to all appointments, and call your doctor if you are having problems. It's also a good idea to know your test results and keep a list of the medicines you take. How can you care for yourself at home? · Be safe with medicines. Take your medicines exactly as prescribed. Call your doctor if you think you are having a problem with your medicine. · Do not take aspirin or anti-inflammatory medicines unless your doctor says it is okay. Examples are ibuprofen (Advil, Motrin) and naproxen (Aleve). They may increase the risk of bleeding. · Avoid contact sports or activities that could cause you to fall. When should you call for help? Call 911 anytime you think you may need emergency care. For example, call if:    · You passed out (lost consciousness).     · You have signs of severe bleeding, which includes:  ? You have a severe headache that is different from past headaches. ? You vomit blood or what looks like coffee grounds. ? Your stools are maroon or very bloody. Call your doctor now or seek immediate medical care if:    · You are dizzy or lightheaded, or you feel like you may faint.     · You have abnormal bleeding, such as:  ? A nosebleed that you can't easily stop. This means it's still bleeding after pressure has been applied 3 times for 10 minutes each time (30 minutes total). ? Your stools are black and look like tar, or they have streaks of blood. ? You have blood in your urine. ? You have joint pain. ? You have bruises or blood spots under your skin. Watch closely for changes in your health, and be sure to contact your doctor if:    · You do not get better as expected. Where can you learn more? Go to http://www.gray.com/  Enter N724 in the search box to learn more about \"Thrombocytopenia: Care Instructions. \"  Current as of: September 23, 2020               Content Version: 12.8  © 7171-9378 Healthwise, Incorporated. Care instructions adapted under license by [employer] (which disclaims liability or warranty for this information). If you have questions about a medical condition or this instruction, always ask your healthcare professional. Enoc Patel any warranty or liability for your use of this information.

## 2021-07-26 NOTE — PROGRESS NOTES
Discharge orders in. Discharge documents and education given to patient and verbalized understanding.

## 2021-07-28 LAB
A PHAGOCYTOPH IGG TITR SER IF: NEGATIVE {TITER}
A PHAGOCYTOPH IGM TITR SER IF: NEGATIVE {TITER}
E CHAFFEENSIS IGG TITR SER IF: NEGATIVE {TITER}
E CHAFFEENSIS IGM TITR SER IF: NEGATIVE {TITER}
R RICKETTSI IGG SER QL IA: POSITIVE
R RICKETTSI IGG TITR SER IF: ABNORMAL {TITER}
R RICKETTSI IGM SER-ACNC: 0.31 INDEX (ref 0–0.89)

## 2021-07-28 RX ORDER — SODIUM CHLORIDE 9 MG/ML
10 INJECTION INTRAMUSCULAR; INTRAVENOUS; SUBCUTANEOUS AS NEEDED
Status: CANCELLED | OUTPATIENT
Start: 2021-07-29

## 2021-07-28 RX ORDER — HEPARIN 100 UNIT/ML
500 SYRINGE INTRAVENOUS AS NEEDED
Status: CANCELLED | OUTPATIENT
Start: 2021-07-29

## 2021-07-28 RX ORDER — SODIUM CHLORIDE 0.9 % (FLUSH) 0.9 %
5-10 SYRINGE (ML) INJECTION AS NEEDED
Status: CANCELLED | OUTPATIENT
Start: 2021-07-29

## 2021-07-29 DIAGNOSIS — D69.3 ACUTE ITP (HCC): Primary | ICD-10-CM

## 2021-07-29 RX ORDER — DOXYCYCLINE 100 MG/1
100 TABLET ORAL 2 TIMES DAILY
Qty: 20 TABLET | Refills: 0 | Status: SHIPPED | OUTPATIENT
Start: 2021-07-29 | End: 2021-08-08

## 2021-07-29 NOTE — PROGRESS NOTES
Physician Progress Note      PATIENT:               Consuelo Wilcox  CSN #:                  [id number]  :                       1960  ADMIT DATE:       2021 10:35 AM  DISCH DATE:        2021 11:39 AM  RESPONDING  PROVIDER #:        Danielle Argueta MD          QUERY TEXT:    Good Morning,    This patent admitted for ITP  The patient had documented melena, and oral bleeding. Hgb on admission 11.9 and @ discharge 9.9    Discharge summary notes \"Normocytic anemia likely consequence of Gi  bleeding in the setting of severe ITP\"    If possible, can you please further clarify the  normocytic anemia and document if this was with or without blood loss, and please  include the acuity document in progress notes and discharge summary further specificity regarding the acuity and type of anemia:    The medical record reflects the following:  Risk Factors: ITP , platelets < 3, Autoimmune history of Lupus, and sjogerens  Clinical Indicators: Bleeding from mouth, melena, platelets < 3, Hgb 93.4 on admission 9.9 at discharge. stool + blood, GI consulted and \"suspects oozing of blood from intestine\"  Treatment: Received Platelets , close monitoring of hgb.hct, Stool for blood, Heme consulted, GI consulted, plan for EGD and colonscopy outpt. Thank you  Larry Wu, CATHERINEn,RN, [address] [place] [address], [address] [place] [address]  Options provided:  -- Anemia due to acute blood loss  -- Anemia due to chronic blood loss  -- Anemia due to acute on chronic blood loss  -- Normocytic anemia not due to blood loss  -- Other - I will add my own diagnosis  -- Disagree - Not applicable / Not valid  -- Disagree - Clinically unable to determine / Unknown  -- Refer to Clinical Documentation Reviewer    PROVIDER RESPONSE TEXT:    This patient has acute blood loss anemia.     Query created by: Ricardo Weldon on 2021 11:18 AM      Electronically signed by:  Danielle Argueta MD 2021 11:39 AM

## 2021-07-30 ENCOUNTER — DOCUMENTATION ONLY (OUTPATIENT)
Dept: ONCOLOGY | Age: 61
End: 2021-07-30

## 2021-07-30 ENCOUNTER — TELEPHONE (OUTPATIENT)
Dept: ONCOLOGY | Age: 61
End: 2021-07-30

## 2021-07-30 DIAGNOSIS — D69.3 ACUTE ITP (HCC): Primary | ICD-10-CM

## 2021-07-30 NOTE — PROGRESS NOTES
Positive [facility] lab for the acute ITP. I have gone ahead and sent in the doxy for her to  to tonight to start asap. She was instructed to go to the ER if sx worsen in anyway and keep hem/onc appt.      Stella Chan, [address]

## 2021-07-30 NOTE — TELEPHONE ENCOUNTER
Called the patient and verified ID x 2. Asked the patient if she had her labs drawn on 7/29/21 and the patient stated that she went to the lab draw site and they did not have any orders. Informed the patient that this office was made aware and sent in lab orders electronically. The patient stated that she had left by then. Apologized for the miscommunication and asked if the patient is able to go to the [facility] on Monday 8/2/21 for a lab draw and the patient stated that she has been diagnosed with [address] [facility] [address] Fever and asked if she can have labs drawn at [facility] because she is going out of town. Dr. Viola Martínez requested that the call be transferred to him and Dr. Viola Martínez spoke to the patient and addressed her questions and concerns. Per Dr. Viola Martínez, the patient is able to have labs drawn at the [facility] on Monday 8/2/21 or Tuesday 8/3/21 in order for results to be available for her appointment on Friday 8/6/21 and asked where this office can fax the lab orders. The patient asked that the lab orders be faxed to her at [phone]. Informed the patient that lab orders will be faxed to the requested fax number and to call this office Monday 8/2/21 if they are not received. The patient verbalized understanding and denied any questions or concerns. Lab orders faxed and fax confirmation received.

## 2021-07-30 NOTE — PROGRESS NOTES
Patient will need to come for a CBC check today with [facility]. She was to go to [facility] yesterday but somehow went to the Lab at the [facility] and she reportedly did not have any labs drawn as the lab cannot see [facility] lab orders. Please call her today to see if she can come in this afternoon to [facility].       Thanks,    Dr. Carrie Grewal    CC:  Ar Mendez

## 2021-08-03 LAB
BASOPHILS # BLD AUTO: 0.1 X10E3/UL (ref 0–0.2)
BASOPHILS NFR BLD AUTO: 1 %
EOSINOPHIL # BLD AUTO: 0.1 X10E3/UL (ref 0–0.4)
EOSINOPHIL NFR BLD AUTO: 2 %
ERYTHROCYTE [DISTWIDTH] IN BLOOD BY AUTOMATED COUNT: 13 % (ref 11.7–15.4)
HCT VFR BLD AUTO: 36 % (ref 34–46.6)
HGB BLD-MCNC: 11.5 G/DL (ref 11.1–15.9)
IMM GRANULOCYTES # BLD AUTO: 0.1 X10E3/UL (ref 0–0.1)
IMM GRANULOCYTES NFR BLD AUTO: 1 %
LYMPHOCYTES # BLD AUTO: 2.2 X10E3/UL (ref 0.7–3.1)
LYMPHOCYTES NFR BLD AUTO: 31 %
MCH RBC QN AUTO: 29.6 PG (ref 26.6–33)
MCHC RBC AUTO-ENTMCNC: 31.9 G/DL (ref 31.5–35.7)
MCV RBC AUTO: 93 FL (ref 79–97)
MONOCYTES # BLD AUTO: 0.7 X10E3/UL (ref 0.1–0.9)
MONOCYTES NFR BLD AUTO: 9 %
NEUTROPHILS # BLD AUTO: 3.9 X10E3/UL (ref 1.4–7)
NEUTROPHILS NFR BLD AUTO: 56 %
PLATELET # BLD AUTO: 466 X10E3/UL (ref 150–450)
RBC # BLD AUTO: 3.88 X10E6/UL (ref 3.77–5.28)
WBC # BLD AUTO: 7 X10E3/UL (ref 3.4–10.8)

## 2021-08-06 ENCOUNTER — OFFICE VISIT (OUTPATIENT)
Dept: ONCOLOGY | Age: 61
End: 2021-08-06
Payer: COMMERCIAL

## 2021-08-06 VITALS
RESPIRATION RATE: 16 BRPM | TEMPERATURE: 98.3 F | SYSTOLIC BLOOD PRESSURE: 97 MMHG | DIASTOLIC BLOOD PRESSURE: 70 MMHG | HEIGHT: 64 IN | OXYGEN SATURATION: 100 % | HEART RATE: 77 BPM | WEIGHT: 138.2 LBS | BODY MASS INDEX: 23.6 KG/M2

## 2021-08-06 DIAGNOSIS — D75.839 THROMBOCYTOSIS: Primary | ICD-10-CM

## 2021-08-06 DIAGNOSIS — Z86.19: ICD-10-CM

## 2021-08-06 DIAGNOSIS — Z86.2 HISTORY OF ITP: ICD-10-CM

## 2021-08-06 PROCEDURE — 99213 OFFICE O/P EST LOW 20 MIN: CPT | Performed by: INTERNAL MEDICINE

## 2021-08-06 RX ORDER — METHOCARBAMOL 750 MG/1
750 TABLET, FILM COATED ORAL 3 TIMES DAILY
COMMUNITY
End: 2021-08-08 | Stop reason: SDUPTHER

## 2021-08-06 NOTE — PROGRESS NOTES
[facility] [place] at [address], [facility]  Gonzalo Juarez: [phone]  F: [phone] Patient ID  Name: Mimi Glynn  YOB: 1960  MRN: [id number]  Referring Provider:   No referring provider defined for this encounter. Primary Care Provider:   Lisa Escobedo NP       Date: 08/06/21  Reason for Visit:     Chief Complaint   Patient presents with   [facility] Patient   [facility] Follow Up     Subjective:   Mimi Glynn is a 64 y.o. F who presents for a follow-up evaluation for THROMBOCYTOPENIA. Patient reports that she is doing well overall. Patient denies any bowel or bladder problems. She reports normal appetite. Reports petechial rash has subsided. Denies any bleeding issues. Reports completing doxycycline. She reports a history of chronic tick bites. She denies any fevers. Denies fatigue. Denies nausea/vomiting. Has chronic polyuria for 30 years reportedly. She has chronic neck pain 5/10 pain scale. Otherwise denies any mouth sores. Denies shortness of breath. Past Medical History:   Diagnosis Date    Arthritis     Degenerative disc disease     Fibromyalgia       Past Surgical History:   Procedure Laterality Date    HX HEENT      HX ORTHOPAEDIC        Social History     Tobacco Use    Smoking status: Never Smoker    Smokeless tobacco: Never Used   Substance Use Topics    Alcohol use: Yes     Alcohol/week: 1.7 standard drinks     Types: 2 Glasses of wine per week      Family History   Problem Relation Age of Onset    Parkinsonism Father      Current Outpatient Medications   Medication Sig    cinnamon bark (CINNAMON PO) Take  by mouth.  methocarbamoL (ROBAXIN) 750 mg tablet Take 750 mg by mouth three (3) times daily. As needed    doxycycline (ADOXA) 100 mg tablet Take 1 Tablet by mouth two (2) times a day for 10 days.     butalbital-acetaminophen-caffeine (FIORICET, ESGIC) -40 mg per tablet Take 1 Tablet by mouth every six (6) hours as needed for Headache for up to 15 doses.  OTHER,NON-FORMULARY, Hepacaps: Take one capsule by mouth twice daily    cholecalciferol, vitamin D3, (Vitamin D3) 50 mcg (2,000 unit) tab Take 2,000 Units by mouth two (2) times a day.  calcium/mag/vitamin D2/Zn/min (ADAM-MAG ZINC II PO) Take 1 Tablet by mouth two (2) times a day.  Omega-3 Fatty Acids 60- mg cpDR Take 1 Caplet by mouth two (2) times a day.  multivitamin (ONE A DAY) tablet Take 1 Tab by mouth daily.  turmeric 400 mg cap Take 1 Caplet by mouth two (2) times a day.  B.infantis-B.ani-B.long-B.bifi (PROBIOTIC 4X) 10-15 mg TbEC Take 1 Caplet by mouth two (2) times a day.  ascorbic acid, vitamin C, (VITAMIN C) 500 mg tablet Take 1,000 mg by mouth two (2) times a day.  glucosamine-chondroitin (ARTHX) 500-400 mg cap Take 1 Capsule by mouth two (2) times a day.  butalbital-acetaminophen-caffeine (FIORICET, ESGIC) -40 mg per tablet Take 1 Tablet by mouth every six (6) hours as needed for Headache for up to 15 doses. (Patient not taking: Reported on 8/6/2021)    tiZANidine (Zanaflex) 4 mg tablet Take 4 mg by mouth nightly. (Patient not taking: Reported on 8/6/2021)     No current facility-administered medications for this visit.       Allergies   Allergen Reactions    Doxycycline Other (comments)     headache    Sulfa (Sulfonamide Antibiotics) Other (comments)     headache      Review of Systems provided by: patient  General: denies fever and denies fatigue  HEENT: denies epistaxis, denies trouble swallowing and denies oral mucositis  Eyes: denies any vision loss and denies any eye pain  Cardio: denies any chest pain and denies any leg swelling  Resp: denies any shortness of breath and denies any hemoptysis  Abdomen: denies any abdominal pain, denies any nausea, denies any vomiting, denies any diarrhea and denies any constipation  Lymph: denies any lymph node enlargement and denies any lymph node tenderness  Skin: denies any rash, denies any itching, reports bruising improving. MSK: denies any myalgias and denies any arthralgias  Neuro: denies any tremor and denies any imbalance  Psych: denies depression and denies any significant stressors        Objective:     Visit Vitals  BP 97/70 (BP 1 Location: Left upper arm, BP Patient Position: Sitting, BP Cuff Size: Adult)   Pulse 77   Temp 98.3 °F (36.8 °C) (Temporal)   Resp 16   Ht 5' 3.5\" (1.613 m)   Wt 138 lb 3.2 oz (62.7 kg)   SpO2 100%   BMI 24.10 kg/m²     ECOG PS: 0- Fully active, able to carry on all pre-disease performance without restriction. Physical Exam  Constitutional:  No acute distress. Non-toxic appearance. Non-diaphoretic. HENT: Normocephalic and atraumatic head. Mouth/Throat: Oropharynx is clear. No oropharyngeal exudate. No oral mucositis. Eyes: No scleral icterus. Conjunctivae normal.   Cardiovascular: Heart sounds: Normal heart sounds. No friction rub. No gallop. No pitting/trace edema. Pulmonary: Pulmonary effort is normal. No respiratory distress. Breath sounds: No wheezing, rhonchi or rales. Abdominal: General: Bowel sounds are normal. Palpations: Abdomen is soft. Tenderness: There is no abdominal tenderness. No guarding. Skin: Skin is not jaundiced. No rash. No petechiae. Still with mild bruising on lower extremities. Musculoskeletal: No muscle pain on palpation. No temporal muscle wasting on inspection. Lymph: No cervical, supraclavicular,or axillary lymph node enlargement or tenderness. Neurological: Alert and oriented to person, place, and time. Mental status is at baseline. and Normal Gait. Psychiatric: mood normal. normal speech rate and normal affect      Results:     Lab Results   Component Value Date/Time    WBC 7.0 08/02/2021 12:48 PM    HGB 11.5 08/02/2021 12:48 PM    HCT 36.0 08/02/2021 12:48 PM    PLATELET 105 (H) 08/00/6349 12:48 PM    MCV 93 08/02/2021 12:48 PM    ABS.  NEUTROPHILS 3.9 08/02/2021 12:48 PM     Lab Results   Component Value Date/Time    Sodium 141 07/26/2021 05:19 AM    Potassium 4.0 07/26/2021 05:19 AM    Chloride 110 (H) 07/26/2021 05:19 AM    CO2 28 07/26/2021 05:19 AM    Glucose 96 07/26/2021 05:19 AM    BUN 20 07/26/2021 05:19 AM    Creatinine 0.60 07/26/2021 05:19 AM    GFR est AA >60 07/26/2021 05:19 AM    GFR est non-AA >60 07/26/2021 05:19 AM    Calcium 8.4 (L) 07/26/2021 05:19 AM    Glucose (POC) 112 07/26/2021 06:32 AM     Lab Results   Component Value Date/Time    Bilirubin, total 0.6 07/25/2021 03:11 AM    ALT (SGPT) 21 07/25/2021 03:11 AM    Alk. phosphatase 96 07/25/2021 03:11 AM    Protein, total 6.8 07/25/2021 03:11 AM    Albumin 3.7 07/25/2021 03:11 AM    Globulin 3.1 07/25/2021 03:11 AM       Assessment and Recommendations:   Diagnoses and all orders for this visit:    1. Thrombocytosis ([address]   Patient with an elevated platelet count at 188. Since we treated her for severe ITP I would recommend that we simply monitor this with repeat CBC over time. 2. History of rickettsial disease   I recommend the patient follow-up with her primary care provider. We discussed that it remains unclear if she had any active [facility] spotted fever disease. We discussed that one would think she would have developed symptoms on such high-dose immunosuppression for treatment with ITP. However, I did review with her CDC guidelines regarding tick bite prevention. 3. History of ITP   Clinically, she seemed to respond to steroids and there was no clear other explanation for her severe thrombocytopenia. She will continue to follow-up and will undergo repeat CBC in a few weeks then see my in 3 months or sooner if needed. She understands that if she has ITP it can come back. However, we remain hopeful that she will be without any recurrence. Unclear if thrombocytopenia related to any tick borne illness.  -     CBC WITH AUTOMATED DIFF;  Future    Follow-up and Dispositions  ·   Return in about 3 months (around 11/6/2021).          Signed By:   MD Kayode Heck MD  Hematology/Medical Oncology Provider  [facility]  P: [phone]

## 2021-08-06 NOTE — LETTER
8/8/2021    Patient: Abdirashid Roper   YOB: 1960   Date of Visit: 8/6/2021     Candelario Cardona NP  [address] [place] [address]  [address] [facility] [address]    Dear Candelario Cardona NP,      Thank you for referring Ms. Isis Spain to [facility] for evaluation. My notes for this consultation are attached. If you have questions, please do not hesitate to call me. I look forward to following your patient along with you.       Sincerely,    Lissett Rg MD

## 2021-08-06 NOTE — PROGRESS NOTES
Identified pt with two pt identifiers(name and ). Reviewed record in preparation for visit and have obtained necessary documentation.   Chief Complaint   Patient presents with   [address] [facility] [address] Patient   [facility] Follow Up        Health Maintenance Due   Topic    COVID-19 Vaccine (1)    Shingrix Vaccine Age 49> (1 of 2)    Breast Cancer Screen Mammogram     PAP AKA CERVICAL CYTOLOGY         Visit Vitals  BP 97/70 (BP 1 Location: Left upper arm, BP Patient Position: Sitting, BP Cuff Size: Adult)   Pulse 77   Temp 98.3 °F (36.8 °C) (Temporal)   Resp 16   Ht 5' 3.5\" (1.613 m)   Wt 138 lb 3.2 oz (62.7 kg)   SpO2 100%   BMI 24.10 kg/m²     Pain Scale: /10

## 2021-08-08 PROBLEM — D75.839 THROMBOCYTOSIS: Status: ACTIVE | Noted: 2021-08-06

## 2021-08-08 PROBLEM — Z86.2 HISTORY OF ITP: Status: ACTIVE | Noted: 2021-08-06

## 2021-08-08 RX ORDER — METHOCARBAMOL 750 MG/1
750 TABLET, FILM COATED ORAL
Qty: 90 TABLET | Refills: 1 | Status: SHIPPED | OUTPATIENT
Start: 2021-08-08 | End: 2022-04-04

## 2021-08-08 RX ORDER — BUTALBITAL, ACETAMINOPHEN AND CAFFEINE 50; 325; 40 MG/1; MG/1; MG/1
1 TABLET ORAL
Qty: 45 TABLET | Refills: 0 | Status: SHIPPED | OUTPATIENT
Start: 2021-08-08 | End: 2021-09-16

## 2021-09-10 LAB
BASOPHILS # BLD AUTO: 0.1 X10E3/UL (ref 0–0.2)
BASOPHILS NFR BLD AUTO: 2 %
EOSINOPHIL # BLD AUTO: 0.1 X10E3/UL (ref 0–0.4)
EOSINOPHIL NFR BLD AUTO: 3 %
ERYTHROCYTE [DISTWIDTH] IN BLOOD BY AUTOMATED COUNT: 13 % (ref 11.7–15.4)
HCT VFR BLD AUTO: 37.7 % (ref 34–46.6)
HGB BLD-MCNC: 11.9 G/DL (ref 11.1–15.9)
IMM GRANULOCYTES # BLD AUTO: 0 X10E3/UL (ref 0–0.1)
IMM GRANULOCYTES NFR BLD AUTO: 0 %
LYMPHOCYTES # BLD AUTO: 1.7 X10E3/UL (ref 0.7–3.1)
LYMPHOCYTES NFR BLD AUTO: 36 %
MCH RBC QN AUTO: 29.7 PG (ref 26.6–33)
MCHC RBC AUTO-ENTMCNC: 31.6 G/DL (ref 31.5–35.7)
MCV RBC AUTO: 94 FL (ref 79–97)
MONOCYTES # BLD AUTO: 0.4 X10E3/UL (ref 0.1–0.9)
MONOCYTES NFR BLD AUTO: 9 %
NEUTROPHILS # BLD AUTO: 2.4 X10E3/UL (ref 1.4–7)
NEUTROPHILS NFR BLD AUTO: 50 %
PLATELET # BLD AUTO: 224 X10E3/UL (ref 150–450)
RBC # BLD AUTO: 4.01 X10E6/UL (ref 3.77–5.28)
WBC # BLD AUTO: 4.8 X10E3/UL (ref 3.4–10.8)

## 2021-09-16 RX ORDER — BUTALBITAL, ACETAMINOPHEN AND CAFFEINE 50; 325; 40 MG/1; MG/1; MG/1
TABLET ORAL
Qty: 45 TABLET | Refills: 0 | Status: SHIPPED | OUTPATIENT
Start: 2021-09-16 | End: 2021-10-24

## 2021-10-24 RX ORDER — BUTALBITAL, ACETAMINOPHEN AND CAFFEINE 50; 325; 40 MG/1; MG/1; MG/1
TABLET ORAL
Qty: 45 TABLET | Refills: 0 | Status: SHIPPED | OUTPATIENT
Start: 2021-10-24 | End: 2021-11-29

## 2021-11-29 RX ORDER — BUTALBITAL, ACETAMINOPHEN AND CAFFEINE 50; 325; 40 MG/1; MG/1; MG/1
TABLET ORAL
Qty: 45 TABLET | Refills: 0 | Status: SHIPPED | OUTPATIENT
Start: 2021-11-29 | End: 2022-01-09

## 2022-01-09 RX ORDER — BUTALBITAL, ACETAMINOPHEN AND CAFFEINE 50; 325; 40 MG/1; MG/1; MG/1
TABLET ORAL
Qty: 45 TABLET | Refills: 0 | Status: SHIPPED | OUTPATIENT
Start: 2022-01-09 | End: 2022-02-06

## 2022-02-06 RX ORDER — BUTALBITAL, ACETAMINOPHEN AND CAFFEINE 50; 325; 40 MG/1; MG/1; MG/1
TABLET ORAL
Qty: 45 TABLET | Refills: 0 | Status: SHIPPED | OUTPATIENT
Start: 2022-02-06 | End: 2022-02-25

## 2022-02-25 RX ORDER — BUTALBITAL, ACETAMINOPHEN AND CAFFEINE 50; 325; 40 MG/1; MG/1; MG/1
TABLET ORAL
Qty: 45 TABLET | Refills: 0 | Status: SHIPPED | OUTPATIENT
Start: 2022-02-25 | End: 2022-04-04

## 2022-03-18 PROBLEM — R77.8 LOW SERUM HAPTOGLOBIN: Status: ACTIVE | Noted: 2021-07-23

## 2022-03-18 PROBLEM — D64.9 NORMOCYTIC ANEMIA: Status: ACTIVE | Noted: 2021-07-26

## 2022-03-19 PROBLEM — D75.839 THROMBOCYTOSIS: Status: ACTIVE | Noted: 2021-08-06

## 2022-03-19 PROBLEM — Z86.2 HISTORY OF ITP: Status: ACTIVE | Noted: 2021-08-06

## 2022-03-19 PROBLEM — R23.3 PETECHIAE: Status: ACTIVE | Noted: 2021-07-23

## 2022-03-19 PROBLEM — K92.1 MELENA: Status: ACTIVE | Noted: 2021-07-23

## 2022-03-19 PROBLEM — Z86.19: Status: ACTIVE | Noted: 2021-08-06

## 2022-03-19 PROBLEM — D69.3 ACUTE ITP (HCC): Status: ACTIVE | Noted: 2021-07-23

## 2022-03-19 PROBLEM — Z79.899 HIGH RISK MEDICATION USE: Status: ACTIVE | Noted: 2021-07-23

## 2022-03-19 PROBLEM — D69.6 SEVERE THROMBOCYTOPENIA (HCC): Status: ACTIVE | Noted: 2021-07-23

## 2022-04-04 RX ORDER — BUTALBITAL, ACETAMINOPHEN AND CAFFEINE 50; 325; 40 MG/1; MG/1; MG/1
TABLET ORAL
Qty: 45 TABLET | Refills: 0 | Status: SHIPPED | OUTPATIENT
Start: 2022-04-04 | End: 2022-05-01

## 2022-04-04 RX ORDER — METHOCARBAMOL 750 MG/1
TABLET, FILM COATED ORAL
Qty: 90 TABLET | Refills: 1 | Status: SHIPPED | OUTPATIENT
Start: 2022-04-04 | End: 2022-05-05 | Stop reason: ALTCHOICE

## 2022-05-01 RX ORDER — BUTALBITAL, ACETAMINOPHEN AND CAFFEINE 50; 325; 40 MG/1; MG/1; MG/1
TABLET ORAL
Qty: 45 TABLET | Refills: 0 | Status: SHIPPED | OUTPATIENT
Start: 2022-05-01 | End: 2022-06-02 | Stop reason: SDUPTHER

## 2022-05-05 ENCOUNTER — OFFICE VISIT (OUTPATIENT)
Dept: FAMILY MEDICINE CLINIC | Age: 62
End: 2022-05-05
Payer: COMMERCIAL

## 2022-05-05 VITALS
DIASTOLIC BLOOD PRESSURE: 62 MMHG | OXYGEN SATURATION: 99 % | TEMPERATURE: 98.4 F | HEIGHT: 63 IN | WEIGHT: 132 LBS | HEART RATE: 60 BPM | BODY MASS INDEX: 23.39 KG/M2 | RESPIRATION RATE: 16 BRPM | SYSTOLIC BLOOD PRESSURE: 96 MMHG

## 2022-05-05 DIAGNOSIS — D47.3 ESSENTIAL (HEMORRHAGIC) THROMBOCYTHEMIA (HCC): ICD-10-CM

## 2022-05-05 DIAGNOSIS — E55.9 VITAMIN D DEFICIENCY: ICD-10-CM

## 2022-05-05 DIAGNOSIS — Z12.31 VISIT FOR SCREENING MAMMOGRAM: ICD-10-CM

## 2022-05-05 DIAGNOSIS — L98.9 SKIN LESION OF FACE: Primary | ICD-10-CM

## 2022-05-05 DIAGNOSIS — Z00.00 WELL ADULT EXAM: ICD-10-CM

## 2022-05-05 DIAGNOSIS — R73.01 IMPAIRED FASTING BLOOD SUGAR: ICD-10-CM

## 2022-05-05 PROCEDURE — 99396 PREV VISIT EST AGE 40-64: CPT | Performed by: NURSE PRACTITIONER

## 2022-05-05 RX ORDER — METAXALONE 800 MG/1
800 TABLET ORAL
Qty: 180 TABLET | Refills: 1 | Status: SHIPPED | OUTPATIENT
Start: 2022-05-05

## 2022-05-05 NOTE — PROGRESS NOTES
Chief Complaint   Patient presents with    Annual Wellness Visit    Labs     Pt being seen for wellness visit  -labs  Pt wants to discuss having her muscle relaxer changed  -pt needs referral to derm     1. Have you been to the ER, urgent care clinic since your last visit? Hospitalized since your last visit? No    2. Have you seen or consulted any other health care providers outside of the [address] [place] [address] since your last visit? Include any pap smears or colon screening.  No     Pt has no other concerns

## 2022-05-05 NOTE — PROGRESS NOTES
Subjective:   63 y.o. female for Well Woman Check. Her gyne and breast care is done elsewhere by her Ob-Gyne physician. Patient Active Problem List    Diagnosis Date Noted    History of rickettsial disease 08/06/2021    History of ITP 08/06/2021    Thrombocytosis 08/06/2021    Normocytic anemia 07/26/2021    Severe thrombocytopenia (HCC) 07/23/2021    Melena 07/23/2021    Low serum haptoglobin 07/23/2021    High risk medication use 07/23/2021    Acute ITP ([address] 07/23/2021    Petechiae 07/23/2021    Fibromyalgia 12/06/2013    DDD (degenerative disc disease), cervical 12/06/2013    DDD (degenerative disc disease), lumbar 12/06/2013     Current Outpatient Medications   Medication Sig Dispense Refill    metaxalone (SKELAXIN) 800 mg tablet Take 1 Tablet by mouth three (3) times daily as needed for Muscle Spasm(s). 180 Tablet 1    butalbital-acetaminophen-caffeine (FIORICET, ESGIC) -40 mg per tablet TAKE 1 TABLET EVERY 6 HOURSAS NEEDED FOR HEADACHE 45 Tablet 0    cinnamon bark (CINNAMON PO) Take  by mouth.  OTHER,NON-FORMULARY, Hepacaps: Take one capsule by mouth twice daily      cholecalciferol, vitamin D3, (Vitamin D3) 50 mcg (2,000 unit) tab Take 2,000 Units by mouth two (2) times a day.  calcium/mag/vitamin D2/Zn/min (ADAM-MAG ZINC II PO) Take 1 Tablet by mouth two (2) times a day.  Omega-3 Fatty Acids 60- mg cpDR Take 1 Caplet by mouth two (2) times a day.  multivitamin (ONE A DAY) tablet Take 1 Tab by mouth daily.  turmeric 400 mg cap Take 1 Caplet by mouth two (2) times a day.  B.infantis-B.ani-B.long-B.bifi (PROBIOTIC 4X) 10-15 mg TbEC Take 1 Caplet by mouth two (2) times a day.  ascorbic acid, vitamin C, (VITAMIN C) 500 mg tablet Take 1,000 mg by mouth two (2) times a day.  glucosamine-chondroitin (ARTHX) 500-400 mg cap Take 1 Capsule by mouth two (2) times a day.  tiZANidine (Zanaflex) 4 mg tablet Take 4 mg by mouth nightly.  (Patient not taking: Reported on 8/6/2021)       Family History   Problem Relation Age of Onset    Parkinsonism Father      Social History     Tobacco Use    Smoking status: Never Smoker    Smokeless tobacco: Never Used   Substance Use Topics    Alcohol use: Yes     Alcohol/week: 1.7 standard drinks     Types: 2 Glasses of wine per week             ROS: Feeling generally well. No TIA's or unusual headaches, no dysphagia. No prolonged cough. No dyspnea or chest pain on exertion. No abdominal pain, change in bowel habits, black or bloody stools. No urinary tract symptoms. No new or unusual musculoskeletal symptoms. Specific concerns today: face lesion of the nose and lip, wants to see derm; wants to switch to skelaxin, tried sisters and worked better. Objective: The patient appears well, alert, oriented x 3, in no distress. Visit Vitals  BP 96/62 (BP 1 Location: Left upper arm, BP Patient Position: Sitting)   Pulse 60   Temp 98.4 °F (36.9 °C) (Oral)   Resp 16   Ht 5' 3\" (1.6 m)   Wt 132 lb (59.9 kg)   SpO2 99%   BMI 23.38 kg/m²     ENT normal.  Neck supple. No adenopathy or thyromegaly. FERNANDO. Lungs are clear, good air entry, no wheezes, rhonchi or rales. S1 and S2 normal, no murmurs, regular rate and rhythm. Abdomen soft without tenderness, guarding, mass or organomegaly. Extremities show no edema, normal peripheral pulses. Neurological is normal, no focal findings. Breast and Pelvic exams are deferred. Assessment/Plan:   Well Woman  increase physical activity, follow low fat diet, follow low salt diet, routine labs ordered  Encounter Diagnoses   Name Primary?     Skin lesion of face Yes    Essential (hemorrhagic) thrombocythemia ([facility]     Well adult exam     Vitamin D deficiency     Visit for screening mammogram     Impaired fasting blood sugar      Orders Placed This Encounter    CJ MAMMO BI SCREENING INCL CAD    CBC WITH AUTOMATED DIFF    METABOLIC PANEL, COMPREHENSIVE    TSH 3RD GENERATION  LIPID PANEL    VITAMIN D, 25 HYDROXY    HEMOGLOBIN A1C WITH EAG    REFERRAL TO DERMATOLOGY    metaxalone (SKELAXIN) 800 mg tablet     I have discussed the diagnosis with the patient and the intended plan as seen in the above orders. The patient has received an after-visit summary and questions were answered concerning future plans. Patient conveyed understanding of the plan at the time of the visit.     Randi Valenzuela, MSN, ANP  5/5/2022

## 2022-05-06 LAB
25(OH)D3+25(OH)D2 SERPL-MCNC: 100 NG/ML (ref 30–100)
ALBUMIN SERPL-MCNC: 4.5 G/DL (ref 3.8–4.9)
ALBUMIN/GLOB SERPL: 1.8 {RATIO} (ref 1.2–2.2)
ALP SERPL-CCNC: 128 IU/L (ref 44–121)
ALT SERPL-CCNC: 14 IU/L (ref 0–32)
AST SERPL-CCNC: 20 IU/L (ref 0–40)
BASOPHILS # BLD AUTO: 0.1 X10E3/UL (ref 0–0.2)
BASOPHILS NFR BLD AUTO: 1 %
BILIRUB SERPL-MCNC: <0.2 MG/DL (ref 0–1.2)
BUN SERPL-MCNC: 14 MG/DL (ref 6–24)
BUN/CREAT SERPL: 20 (ref 9–23)
CALCIUM SERPL-MCNC: 9.3 MG/DL (ref 8.7–10.2)
CHLORIDE SERPL-SCNC: 100 MMOL/L (ref 96–106)
CHOLEST SERPL-MCNC: 202 MG/DL (ref 100–199)
CO2 SERPL-SCNC: 22 MMOL/L (ref 20–29)
CREAT SERPL-MCNC: 0.69 MG/DL (ref 0.57–1)
EGFR: 100 ML/MIN/1.73
EOSINOPHIL # BLD AUTO: 0.1 X10E3/UL (ref 0–0.4)
EOSINOPHIL NFR BLD AUTO: 2 %
ERYTHROCYTE [DISTWIDTH] IN BLOOD BY AUTOMATED COUNT: 12.7 % (ref 11.7–15.4)
EST. AVERAGE GLUCOSE BLD GHB EST-MCNC: 123 MG/DL
GLOBULIN SER CALC-MCNC: 2.5 G/DL (ref 1.5–4.5)
GLUCOSE SERPL-MCNC: 111 MG/DL (ref 65–99)
HBA1C MFR BLD: 5.9 % (ref 4.8–5.6)
HCT VFR BLD AUTO: 36.9 % (ref 34–46.6)
HDLC SERPL-MCNC: 79 MG/DL
HGB BLD-MCNC: 12.2 G/DL (ref 11.1–15.9)
IMM GRANULOCYTES # BLD AUTO: 0 X10E3/UL (ref 0–0.1)
IMM GRANULOCYTES NFR BLD AUTO: 0 %
IMP & REVIEW OF LAB RESULTS: NORMAL
LDLC SERPL CALC-MCNC: 106 MG/DL (ref 0–99)
LYMPHOCYTES # BLD AUTO: 1.8 X10E3/UL (ref 0.7–3.1)
LYMPHOCYTES NFR BLD AUTO: 38 %
MCH RBC QN AUTO: 31.2 PG (ref 26.6–33)
MCHC RBC AUTO-ENTMCNC: 33.1 G/DL (ref 31.5–35.7)
MCV RBC AUTO: 94 FL (ref 79–97)
MONOCYTES # BLD AUTO: 0.5 X10E3/UL (ref 0.1–0.9)
MONOCYTES NFR BLD AUTO: 10 %
NEUTROPHILS # BLD AUTO: 2.3 X10E3/UL (ref 1.4–7)
NEUTROPHILS NFR BLD AUTO: 49 %
PLATELET # BLD AUTO: 208 X10E3/UL (ref 150–450)
POTASSIUM SERPL-SCNC: 4.6 MMOL/L (ref 3.5–5.2)
PROT SERPL-MCNC: 7 G/DL (ref 6–8.5)
RBC # BLD AUTO: 3.91 X10E6/UL (ref 3.77–5.28)
SODIUM SERPL-SCNC: 141 MMOL/L (ref 134–144)
TRIGL SERPL-MCNC: 99 MG/DL (ref 0–149)
TSH SERPL DL<=0.005 MIU/L-ACNC: 0.55 UIU/ML (ref 0.45–4.5)
VLDLC SERPL CALC-MCNC: 17 MG/DL (ref 5–40)
WBC # BLD AUTO: 4.8 X10E3/UL (ref 3.4–10.8)

## 2022-06-02 RX ORDER — BUTALBITAL, ACETAMINOPHEN AND CAFFEINE 50; 325; 40 MG/1; MG/1; MG/1
TABLET ORAL
Qty: 45 TABLET | Refills: 0 | Status: SHIPPED | OUTPATIENT
Start: 2022-06-02 | End: 2022-07-21 | Stop reason: SDUPTHER

## 2022-06-10 ENCOUNTER — DOCUMENTATION ONLY (OUTPATIENT)
Dept: FAMILY MEDICINE CLINIC | Age: 62
End: 2022-06-10

## 2022-06-10 NOTE — PROGRESS NOTES
PA for Metaxalone was submitted to [facility] arnoldo via cover my meds, awaiting response.

## 2022-06-19 RX ORDER — METHOCARBAMOL 750 MG/1
TABLET, FILM COATED ORAL
Qty: 90 TABLET | Refills: 1 | Status: SHIPPED | OUTPATIENT
Start: 2022-06-19

## 2022-07-21 RX ORDER — BUTALBITAL, ACETAMINOPHEN AND CAFFEINE 50; 325; 40 MG/1; MG/1; MG/1
TABLET ORAL
Qty: 45 TABLET | Refills: 0 | Status: SHIPPED | OUTPATIENT
Start: 2022-07-21 | End: 2022-08-14

## 2022-08-14 RX ORDER — BUTALBITAL, ACETAMINOPHEN AND CAFFEINE 50; 325; 40 MG/1; MG/1; MG/1
TABLET ORAL
Qty: 45 TABLET | Refills: 0 | Status: SHIPPED | OUTPATIENT
Start: 2022-08-14 | End: 2022-09-18

## 2022-09-18 RX ORDER — BUTALBITAL, ACETAMINOPHEN AND CAFFEINE 50; 325; 40 MG/1; MG/1; MG/1
TABLET ORAL
Qty: 45 TABLET | Refills: 0 | Status: SHIPPED | OUTPATIENT
Start: 2022-09-18 | End: 2022-10-18

## 2022-10-18 RX ORDER — BUTALBITAL, ACETAMINOPHEN AND CAFFEINE 50; 325; 40 MG/1; MG/1; MG/1
TABLET ORAL
Qty: 45 TABLET | Refills: 0 | Status: SHIPPED | OUTPATIENT
Start: 2022-10-18

## 2022-11-22 RX ORDER — BUTALBITAL, ACETAMINOPHEN AND CAFFEINE 50; 325; 40 MG/1; MG/1; MG/1
TABLET ORAL
Qty: 45 TABLET | Refills: 0 | Status: SHIPPED | OUTPATIENT
Start: 2022-11-22

## 2022-11-22 RX ORDER — METHOCARBAMOL 750 MG/1
TABLET, FILM COATED ORAL
Qty: 90 TABLET | Refills: 1 | Status: SHIPPED | OUTPATIENT
Start: 2022-11-22

## 2023-01-08 RX ORDER — BUTALBITAL, ACETAMINOPHEN AND CAFFEINE 50; 325; 40 MG/1; MG/1; MG/1
TABLET ORAL
Qty: 45 TABLET | Refills: 0 | Status: SHIPPED | OUTPATIENT
Start: 2023-01-08

## 2023-03-01 ENCOUNTER — VIRTUAL VISIT (OUTPATIENT)
Dept: FAMILY MEDICINE CLINIC | Age: 63
End: 2023-03-01
Payer: COMMERCIAL

## 2023-03-01 DIAGNOSIS — M50.30 DDD (DEGENERATIVE DISC DISEASE), CERVICAL: Primary | ICD-10-CM

## 2023-03-01 PROCEDURE — 99213 OFFICE O/P EST LOW 20 MIN: CPT | Performed by: NURSE PRACTITIONER

## 2023-03-01 RX ORDER — BUTALBITAL, ACETAMINOPHEN AND CAFFEINE 50; 325; 40 MG/1; MG/1; MG/1
TABLET ORAL
Qty: 45 TABLET | Refills: 0 | Status: CANCELLED | OUTPATIENT
Start: 2023-03-01

## 2023-03-01 RX ORDER — CELECOXIB 200 MG/1
200 CAPSULE ORAL DAILY
Qty: 90 CAPSULE | Refills: 1 | Status: SHIPPED | OUTPATIENT
Start: 2023-03-01 | End: 2023-05-30

## 2023-03-01 RX ORDER — BUTALBITAL, ACETAMINOPHEN AND CAFFEINE 50; 325; 40 MG/1; MG/1; MG/1
1 TABLET ORAL
Qty: 45 TABLET | Refills: 0 | Status: SHIPPED | OUTPATIENT
Start: 2023-03-01

## 2023-03-01 NOTE — PROGRESS NOTES
Chief Complaint   Patient presents with    Medication Refill     Pt being seen for refill of her headache medication  -pended for provider     1. Have you been to the ER, urgent care clinic since your last visit? Hospitalized since your last visit? No    2. Have you seen or consulted any other health care providers outside of the [address] [place] [address] since your last visit? Include any pap smears or colon screening.  No    Pt has no other concerns

## 2023-03-05 NOTE — PROGRESS NOTES
[address] (: 1960) is a 63 y.o. female, established patient, here for evaluation of the following chief complaint(s):   Medication Refill       ASSESSMENT/PLAN:  Below is the assessment and plan developed based on review of pertinent history, labs, studies, and medications. Diagnoses and all orders for this visit:    1. DDD (degenerative disc disease), cervical    Other orders  -     celecoxib (CELEBREX) 200 mg capsule; Take 1 Capsule by mouth daily for 90 days. -     butalbital-acetaminophen-caffeine (FIORICET, ESGIC) -40 mg per tablet; Take 1 Tablet by mouth every six (6) hours as needed for Migraine.     Refilled the fioricet but also given celebrex for OA  Reviewed use of med and sparingly use of med due to rebound headache    SUBJECTIVE/OBJECTIVE:  HPI  She is requesting refill of fioricet   Takes for headache  Comes from the neck and unilateral  Does have DDD cervical spine  Has not seen Ortho    Review of Systems   A comprehensive review of system was obtained and negative except findings in the HPI    No data recorded     Physical Exam    [INSTRUCTIONS:  \"[x]\" Indicates a positive item  \"[]\" Indicates a negative item  -- DELETE ALL ITEMS NOT EXAMINED]    Constitutional: [x] Appears well-developed and well-nourished [x] No apparent distress      [] Abnormal -     Mental status: [x] Alert and awake  [x] Oriented to person/place/time [x] Able to follow commands    [] Abnormal -     Eyes:   EOM    [x]  Normal    [] Abnormal -   Sclera  [x]  Normal    [] Abnormal -          Discharge [x]  None visible   [] Abnormal -     HENT: [x] Normocephalic, atraumatic  [] Abnormal -   [x] Mouth/Throat: Mucous membranes are moist    External Ears [x] Normal  [] Abnormal -    Neck: [x] No visualized mass [] Abnormal -     Pulmonary/Chest: [x] Respiratory effort normal   [x] No visualized signs of difficulty breathing or respiratory distress        [] Abnormal -      Musculoskeletal:   [x] Normal gait with no signs of ataxia         [x] Normal range of motion of neck        [] Abnormal -     Neurological:        [x] No Facial Asymmetry (Cranial nerve 7 motor function) (limited exam due to video visit)          [x] No gaze palsy        [] Abnormal -          Skin:        [x] No significant exanthematous lesions or discoloration noted on facial skin         [] Abnormal -            Psychiatric:       [x] Normal Affect [] Abnormal -        [x] No Hallucinations    Other pertinent observable physical exam findings:-    On this date 03/01/2023 I have spent 15 minutes reviewing previous notes, test results and face to face (virtual) with the patient discussing the diagnosis and importance of compliance with the treatment plan as well as documenting on the day of the visit. Varsha Osmar, was evaluated through a synchronous (real-time) audio-video encounter. The patient (or guardian if applicable) is aware that this is a billable service, which includes applicable co-pays. This Virtual Visit was conducted with patient's (and/or legal guardian's) consent. The visit was conducted pursuant to the emergency declaration under the [address] [facility], [address] [place] [address] [facility] authority and the [employer] General Act. Patient identification was verified, and a caregiver was present when appropriate. The patient was located at: Home: [address] [facility]  The provider was located at: Home: [address] [facility] was used to authenticate this note.   -- Juan Willis NP

## 2023-03-09 RX ORDER — BUTALBITAL, ACETAMINOPHEN AND CAFFEINE 50; 325; 40 MG/1; MG/1; MG/1
TABLET ORAL
Qty: 45 TABLET | Refills: 0 | Status: SHIPPED | OUTPATIENT
Start: 2023-03-09

## 2023-04-18 ENCOUNTER — TELEPHONE (OUTPATIENT)
Dept: FAMILY MEDICINE CLINIC | Age: 63
End: 2023-04-18

## 2023-04-18 NOTE — TELEPHONE ENCOUNTER
----- Message from Perfecto Lockhart sent at 4/18/2023 11:01 AM EDT -----  Subject: Medication Problem    Medication: celecoxib (CELEBREX) 200 mg capsule  Dosage: 1 tablet by mouth  Ordering Provider: Annemarie Britton    Question/Problem: Pt insurance will not pay for this medication. She has   also changed her mind on this particular medication and does not want to   take it, She would like a nurse to call her with other options so she can   decide if one of those would be ones she would want to take.       Pharmacy: [facility]    ---------------------------------------------------------------------------  --------------  Wilmer Pereira [facility]  [phone]; OK to leave message on voicemail  ---------------------------------------------------------------------------  --------------    SCRIPT ANSWERS  Relationship to Patient: Self

## 2023-04-19 NOTE — TELEPHONE ENCOUNTER
Spoke with pt, she states that [employer] cover it and also she doesn't want to take it as she stated one of her pt's had a rectal bleed.  Encouraged pt to call her insurance and see what they cover

## 2023-05-16 RX ORDER — METHOCARBAMOL 750 MG/1
TABLET, FILM COATED ORAL
COMMUNITY
Start: 2022-11-22

## 2023-05-16 RX ORDER — TIZANIDINE 4 MG/1
4 TABLET ORAL NIGHTLY
COMMUNITY

## 2023-05-16 RX ORDER — METAXALONE 800 MG/1
800 TABLET ORAL 3 TIMES DAILY PRN
COMMUNITY
Start: 2022-05-05

## 2023-05-16 RX ORDER — BUTALBITAL, ACETAMINOPHEN AND CAFFEINE 50; 325; 40 MG/1; MG/1; MG/1
TABLET ORAL
COMMUNITY
Start: 2023-03-09 | End: 2023-05-22

## 2023-05-16 RX ORDER — ASCORBIC ACID 500 MG
1000 TABLET ORAL 2 TIMES DAILY
COMMUNITY

## 2023-05-16 RX ORDER — CELECOXIB 200 MG/1
200 CAPSULE ORAL DAILY
Qty: 30 CAPSULE | Refills: 2 | COMMUNITY
Start: 2023-03-01 | End: 2023-05-30

## 2023-05-16 RX ORDER — SODIUM PHOSPHATE,MONO-DIBASIC 19G-7G/118
1 ENEMA (ML) RECTAL 2 TIMES DAILY
COMMUNITY

## 2023-05-22 RX ORDER — BUTALBITAL, ACETAMINOPHEN AND CAFFEINE 50; 325; 40 MG/1; MG/1; MG/1
TABLET ORAL
Qty: 45 TABLET | Refills: 0 | Status: SHIPPED | OUTPATIENT
Start: 2023-05-22

## 2023-07-05 RX ORDER — BUTALBITAL, ACETAMINOPHEN AND CAFFEINE 50; 325; 40 MG/1; MG/1; MG/1
TABLET ORAL
Qty: 45 TABLET | Refills: 0 | Status: SHIPPED | OUTPATIENT
Start: 2023-07-05

## 2023-07-18 ENCOUNTER — OFFICE VISIT (OUTPATIENT)
Age: 63
End: 2023-07-18
Payer: COMMERCIAL

## 2023-07-18 VITALS
OXYGEN SATURATION: 99 % | HEIGHT: 63 IN | BODY MASS INDEX: 22.04 KG/M2 | HEART RATE: 90 BPM | DIASTOLIC BLOOD PRESSURE: 64 MMHG | WEIGHT: 124.4 LBS | TEMPERATURE: 98.9 F | SYSTOLIC BLOOD PRESSURE: 99 MMHG | RESPIRATION RATE: 16 BRPM

## 2023-07-18 DIAGNOSIS — E55.9 VITAMIN D DEFICIENCY, UNSPECIFIED: ICD-10-CM

## 2023-07-18 DIAGNOSIS — Z00.00 WELL EXAM WITHOUT ABNORMAL FINDINGS OF PATIENT 18 YEARS OF AGE OR OLDER: Primary | ICD-10-CM

## 2023-07-18 DIAGNOSIS — R73.01 IMPAIRED FASTING GLUCOSE: ICD-10-CM

## 2023-07-18 PROBLEM — D69.3 ACUTE ITP (HCC): Status: RESOLVED | Noted: 2021-07-23 | Resolved: 2023-07-18

## 2023-07-18 PROBLEM — D69.6 SEVERE THROMBOCYTOPENIA (HCC): Status: RESOLVED | Noted: 2021-07-23 | Resolved: 2023-07-18

## 2023-07-18 PROCEDURE — 99396 PREV VISIT EST AGE 40-64: CPT | Performed by: NURSE PRACTITIONER

## 2023-07-18 SDOH — ECONOMIC STABILITY: HOUSING INSECURITY
IN THE LAST 12 MONTHS, WAS THERE A TIME WHEN YOU DID NOT HAVE A STEADY PLACE TO SLEEP OR SLEPT IN A SHELTER (INCLUDING NOW)?: NO

## 2023-07-18 SDOH — ECONOMIC STABILITY: FOOD INSECURITY: WITHIN THE PAST 12 MONTHS, YOU WORRIED THAT YOUR FOOD WOULD RUN OUT BEFORE YOU GOT MONEY TO BUY MORE.: NEVER TRUE

## 2023-07-18 SDOH — ECONOMIC STABILITY: FOOD INSECURITY: WITHIN THE PAST 12 MONTHS, THE FOOD YOU BOUGHT JUST DIDN'T LAST AND YOU DIDN'T HAVE MONEY TO GET MORE.: NEVER TRUE

## 2023-07-18 SDOH — ECONOMIC STABILITY: INCOME INSECURITY: HOW HARD IS IT FOR YOU TO PAY FOR THE VERY BASICS LIKE FOOD, HOUSING, MEDICAL CARE, AND HEATING?: NOT HARD AT ALL

## 2023-07-19 LAB
25(OH)D3 SERPL-MCNC: 99.1 NG/ML (ref 30–100)
ALBUMIN SERPL-MCNC: 4.3 G/DL (ref 3.5–5)
ALBUMIN/GLOB SERPL: 1.4 (ref 1.1–2.2)
ALP SERPL-CCNC: 126 U/L (ref 45–117)
ALT SERPL-CCNC: 19 U/L (ref 12–78)
ANION GAP SERPL CALC-SCNC: 7 MMOL/L (ref 5–15)
AST SERPL-CCNC: 19 U/L (ref 15–37)
BASOPHILS # BLD: 0.1 K/UL (ref 0–0.1)
BASOPHILS NFR BLD: 1 % (ref 0–1)
BILIRUB SERPL-MCNC: 0.2 MG/DL (ref 0.2–1)
BUN SERPL-MCNC: 15 MG/DL (ref 6–20)
BUN/CREAT SERPL: 21 (ref 12–20)
CALCIUM SERPL-MCNC: 9.4 MG/DL (ref 8.5–10.1)
CHLORIDE SERPL-SCNC: 105 MMOL/L (ref 97–108)
CHOLEST SERPL-MCNC: 202 MG/DL
CO2 SERPL-SCNC: 28 MMOL/L (ref 21–32)
CREAT SERPL-MCNC: 0.71 MG/DL (ref 0.55–1.02)
DIFFERENTIAL METHOD BLD: ABNORMAL
EOSINOPHIL # BLD: 0 K/UL (ref 0–0.4)
EOSINOPHIL NFR BLD: 1 % (ref 0–7)
ERYTHROCYTE [DISTWIDTH] IN BLOOD BY AUTOMATED COUNT: 14.1 % (ref 11.5–14.5)
EST. AVERAGE GLUCOSE BLD GHB EST-MCNC: 105 MG/DL
GLOBULIN SER CALC-MCNC: 3 G/DL (ref 2–4)
GLUCOSE SERPL-MCNC: 124 MG/DL (ref 65–100)
HBA1C MFR BLD: 5.3 % (ref 4–5.6)
HCT VFR BLD AUTO: 37.4 % (ref 35–47)
HDLC SERPL-MCNC: 87 MG/DL
HDLC SERPL: 2.3 (ref 0–5)
HGB BLD-MCNC: 11.6 G/DL (ref 11.5–16)
IMM GRANULOCYTES # BLD AUTO: 0 K/UL (ref 0–0.04)
IMM GRANULOCYTES NFR BLD AUTO: 0 % (ref 0–0.5)
LDLC SERPL CALC-MCNC: 101 MG/DL (ref 0–100)
LYMPHOCYTES # BLD: 1.4 K/UL (ref 0.8–3.5)
LYMPHOCYTES NFR BLD: 31 % (ref 12–49)
MCH RBC QN AUTO: 30.7 PG (ref 26–34)
MCHC RBC AUTO-ENTMCNC: 31 G/DL (ref 30–36.5)
MCV RBC AUTO: 98.9 FL (ref 80–99)
MONOCYTES # BLD: 0.4 K/UL (ref 0–1)
MONOCYTES NFR BLD: 8 % (ref 5–13)
NEUTS SEG # BLD: 2.7 K/UL (ref 1.8–8)
NEUTS SEG NFR BLD: 59 % (ref 32–75)
NRBC # BLD: 0 K/UL (ref 0–0.01)
NRBC BLD-RTO: 0 PER 100 WBC
PLATELET # BLD AUTO: 211 K/UL (ref 150–400)
PMV BLD AUTO: 10.9 FL (ref 8.9–12.9)
POTASSIUM SERPL-SCNC: 3.8 MMOL/L (ref 3.5–5.1)
PROT SERPL-MCNC: 7.3 G/DL (ref 6.4–8.2)
RBC # BLD AUTO: 3.78 M/UL (ref 3.8–5.2)
SODIUM SERPL-SCNC: 140 MMOL/L (ref 136–145)
TRIGL SERPL-MCNC: 70 MG/DL
TSH SERPL DL<=0.05 MIU/L-ACNC: 0.39 UIU/ML (ref 0.36–3.74)
VLDLC SERPL CALC-MCNC: 14 MG/DL
WBC # BLD AUTO: 4.6 K/UL (ref 3.6–11)

## 2023-08-14 RX ORDER — BUTALBITAL, ACETAMINOPHEN AND CAFFEINE 50; 325; 40 MG/1; MG/1; MG/1
TABLET ORAL
Qty: 45 TABLET | Refills: 0 | Status: SHIPPED | OUTPATIENT
Start: 2023-08-14

## 2023-10-02 RX ORDER — BUTALBITAL, ACETAMINOPHEN AND CAFFEINE 50; 325; 40 MG/1; MG/1; MG/1
TABLET ORAL
Qty: 45 TABLET | Refills: 0 | Status: SHIPPED | OUTPATIENT
Start: 2023-10-02

## 2023-11-06 RX ORDER — BUTALBITAL, ACETAMINOPHEN AND CAFFEINE 50; 325; 40 MG/1; MG/1; MG/1
TABLET ORAL
Qty: 45 TABLET | Refills: 0 | Status: SHIPPED | OUTPATIENT
Start: 2023-11-06

## 2023-12-12 RX ORDER — BUTALBITAL, ACETAMINOPHEN AND CAFFEINE 50; 325; 40 MG/1; MG/1; MG/1
TABLET ORAL
Qty: 45 TABLET | Refills: 0 | Status: SHIPPED | OUTPATIENT
Start: 2023-12-12

## 2024-01-26 ENCOUNTER — TELEPHONE (OUTPATIENT)
Age: 64
End: 2024-01-26

## 2024-01-26 NOTE — TELEPHONE ENCOUNTER
[facility] needs a refill of methocarbamol (ROBAXIN) 750 MG tablet .  They have 0 pills/supply left and are requesting a 90 day supply with refills.  Pharmacy has been updated in the chart. Patient was advised or scheduled an appointment for the future and to request refills thru the [employer] [facility] or by requesting a refill from their pharmacy in the future.  Patient was also advised to check with their pharmacy for status of when refills are available.

## 2024-01-26 NOTE — TELEPHONE ENCOUNTER
[facility] needs a refill of butalbital-acetaminophen-caffeine (FIORICET, ESGIC) -40 MG per table .  They have 0 pills/supply left and are requesting a 45 day supply with refills.  Pharmacy has been updated in the chart. Patient was advised or scheduled an appointment for the future and to request refills thru the [employer] [facility] or by requesting a refill from their pharmacy in the future.  Patient was also advised to check with their pharmacy for status of when refills are available.

## 2024-01-28 RX ORDER — BUTALBITAL, ACETAMINOPHEN AND CAFFEINE 50; 325; 40 MG/1; MG/1; MG/1
TABLET ORAL
Qty: 45 TABLET | Refills: 0 | Status: SHIPPED | OUTPATIENT
Start: 2024-01-28

## 2024-01-28 RX ORDER — METHOCARBAMOL 750 MG/1
TABLET, FILM COATED ORAL
Qty: 270 TABLET | Refills: 0 | Status: SHIPPED | OUTPATIENT
Start: 2024-01-28

## 2024-03-04 RX ORDER — BUTALBITAL, ACETAMINOPHEN AND CAFFEINE 50; 325; 40 MG/1; MG/1; MG/1
TABLET ORAL
Qty: 45 TABLET | Refills: 0 | Status: SHIPPED | OUTPATIENT
Start: 2024-03-04

## 2024-04-08 RX ORDER — BUTALBITAL, ACETAMINOPHEN AND CAFFEINE 50; 325; 40 MG/1; MG/1; MG/1
TABLET ORAL
Qty: 45 TABLET | Refills: 0 | Status: SHIPPED | OUTPATIENT
Start: 2024-04-08

## 2024-05-13 RX ORDER — BUTALBITAL, ACETAMINOPHEN AND CAFFEINE 50; 325; 40 MG/1; MG/1; MG/1
TABLET ORAL
Qty: 45 TABLET | Refills: 0 | Status: SHIPPED | OUTPATIENT
Start: 2024-05-13

## 2024-06-24 ENCOUNTER — TELEPHONE (OUTPATIENT)
Age: 64
End: 2024-06-24

## 2024-06-24 RX ORDER — BUTALBITAL, ACETAMINOPHEN AND CAFFEINE 50; 325; 40 MG/1; MG/1; MG/1
TABLET ORAL
Qty: 45 TABLET | Refills: 0 | Status: SHIPPED | OUTPATIENT
Start: 2024-06-24

## 2024-06-24 NOTE — TELEPHONE ENCOUNTER
[facility] needs a refill of butalbital acetaminophen.  They have 1 pills/supply left and are requesting a 30 day supply with refills.  Pharmacy has been updated in the chart. Patient was advised or scheduled an appointment for the future and to request refills thru the [employer] [facility] or by requesting a refill from their pharmacy in the future.  Patient was also advised to check with their pharmacy for status of when refills are available.

## 2024-07-24 RX ORDER — BUTALBITAL, ACETAMINOPHEN AND CAFFEINE 50; 325; 40 MG/1; MG/1; MG/1
TABLET ORAL
Qty: 45 TABLET | Refills: 0 | Status: SHIPPED | OUTPATIENT
Start: 2024-07-24

## 2024-08-12 ENCOUNTER — TELEPHONE (OUTPATIENT)
Age: 64
End: 2024-08-12

## 2024-08-12 NOTE — TELEPHONE ENCOUNTER
----- Message from Inocente FOUNTAIN sent at 8/12/2024  2:15 PM EDT -----  Regarding: [facility] Appointment Request  [facility] Appointment Request    Patient needs appointment for [facility] Appointment Type: Annual Visit.    Patient Requested Dates(s):  Patient Requested Time:  Provider Name: Yolis Delacruz YONIS, APRN - NP    Reason for Appointment Request: Established Patient - Available appointments did not meet patient need wants to re-schedule her appointment dated 8/19/24.  --------------------------------------------------------------------------------------------------------------------------    Relationship to Patient: Self     Call Back Information: OK to leave message on [facility]  Preferred Call Back Number: Phone [phone]

## 2024-08-26 RX ORDER — BUTALBITAL, ACETAMINOPHEN AND CAFFEINE 50; 325; 40 MG/1; MG/1; MG/1
TABLET ORAL
Qty: 45 TABLET | Refills: 0 | Status: SHIPPED | OUTPATIENT
Start: 2024-08-26

## 2024-09-30 RX ORDER — BUTALBITAL, ACETAMINOPHEN AND CAFFEINE 50; 325; 40 MG/1; MG/1; MG/1
TABLET ORAL
Qty: 45 TABLET | Refills: 0 | OUTPATIENT
Start: 2024-09-30

## 2025-01-16 ENCOUNTER — OFFICE VISIT (OUTPATIENT)
Facility: CLINIC | Age: 65
End: 2025-01-16

## 2025-01-16 VITALS
DIASTOLIC BLOOD PRESSURE: 70 MMHG | BODY MASS INDEX: 24.1 KG/M2 | RESPIRATION RATE: 19 BRPM | HEART RATE: 57 BPM | TEMPERATURE: 98.4 F | WEIGHT: 136 LBS | SYSTOLIC BLOOD PRESSURE: 110 MMHG | OXYGEN SATURATION: 99 % | HEIGHT: 63 IN

## 2025-01-16 DIAGNOSIS — R73.01 IMPAIRED FASTING GLUCOSE: ICD-10-CM

## 2025-01-16 DIAGNOSIS — D47.3 ESSENTIAL (HEMORRHAGIC) THROMBOCYTHEMIA (HCC): ICD-10-CM

## 2025-01-16 DIAGNOSIS — Z00.00 WELL EXAM WITHOUT ABNORMAL FINDINGS OF PATIENT 18 YEARS OF AGE OR OLDER: Primary | ICD-10-CM

## 2025-01-16 DIAGNOSIS — Z12.11 SCREENING FOR MALIGNANT NEOPLASM OF COLON: ICD-10-CM

## 2025-01-16 DIAGNOSIS — E55.9 VITAMIN D DEFICIENCY, UNSPECIFIED: ICD-10-CM

## 2025-01-16 DIAGNOSIS — Z00.00 WELL EXAM WITHOUT ABNORMAL FINDINGS OF PATIENT 18 YEARS OF AGE OR OLDER: ICD-10-CM

## 2025-01-16 RX ORDER — METHOCARBAMOL 750 MG/1
TABLET, FILM COATED ORAL
Qty: 270 TABLET | Refills: 0 | Status: SHIPPED | OUTPATIENT
Start: 2025-01-16

## 2025-01-16 RX ORDER — BUTALBITAL, ACETAMINOPHEN AND CAFFEINE 50; 325; 40 MG/1; MG/1; MG/1
TABLET ORAL
Qty: 45 TABLET | Refills: 0 | Status: SHIPPED | OUTPATIENT
Start: 2025-01-16

## 2025-01-16 RX ORDER — SCOPOLAMINE 1 MG/3D
1 PATCH, EXTENDED RELEASE TRANSDERMAL
Qty: 4 PATCH | Refills: 0 | Status: SHIPPED | OUTPATIENT
Start: 2025-01-16

## 2025-01-16 SDOH — ECONOMIC STABILITY: FOOD INSECURITY: WITHIN THE PAST 12 MONTHS, THE FOOD YOU BOUGHT JUST DIDN'T LAST AND YOU DIDN'T HAVE MONEY TO GET MORE.: NEVER TRUE

## 2025-01-16 SDOH — ECONOMIC STABILITY: FOOD INSECURITY: WITHIN THE PAST 12 MONTHS, YOU WORRIED THAT YOUR FOOD WOULD RUN OUT BEFORE YOU GOT MONEY TO BUY MORE.: NEVER TRUE

## 2025-01-16 ASSESSMENT — PATIENT HEALTH QUESTIONNAIRE - PHQ9
SUM OF ALL RESPONSES TO PHQ QUESTIONS 1-9: 0
SUM OF ALL RESPONSES TO PHQ QUESTIONS 1-9: 0
1. LITTLE INTEREST OR PLEASURE IN DOING THINGS: NOT AT ALL
SUM OF ALL RESPONSES TO PHQ QUESTIONS 1-9: 0
SUM OF ALL RESPONSES TO PHQ QUESTIONS 1-9: 0
2. FEELING DOWN, DEPRESSED OR HOPELESS: NOT AT ALL
SUM OF ALL RESPONSES TO PHQ9 QUESTIONS 1 & 2: 0

## 2025-01-16 NOTE — PROGRESS NOTES
Chief Complaint   Patient presents with    Annual Exam    Lab Collection     Patient presents in the office today for a cpe and labs.  Patient stated she needs sclopmine patches for a cruise.   No other concerns.    \"Have you been to the ER, urgent care clinic since your last visit?  Hospitalized since your last visit?\"    NO    “Have you seen or consulted any other health care providers outside our system since your last visit?”    NO    Have you had a mammogram?”   NO    No breast cancer screening on file      “Have you had a pap smear?”    NO    No cervical cancer screening on file       “Have you had a colorectal cancer screening such as a colonoscopy/FIT/Cologuard?    NO    No colonoscopy on file  No cologuard on file  Date of last FIT: 7/23/2021   No flexible sigmoidoscopy on file           
is seeking a refill of her Robaxin, a muscle relaxant. She has requested a prescription for ear patches in anticipation of an upcoming cruise.    She has expressed interest in having her thyroid, vitamin D, and A1c levels checked. She has declined the need for a mammogram and Pap smear at this time. Her most recent Cologuard test, conducted 2 years ago, yielded negative results. She has not received the influenza vaccine or the COVID-19 booster this year.    She has a known history of scoliosis, which has been present since childhood. 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 She reports worsening sciatica pain. Her chiropractor has suggested the possibility of giovany placement for her scoliosis. There is no evidence of bone spurs causing additional pressure, but arthritis is present.    MEDICATIONS  Current: Fioricet, Robaxin    IMMUNIZATIONS  She has not received the influenza vaccine or the COVID-19 booster this year.    Review of Systems   A comprehensive review of system was obtained and negative except findings in the HPI      Objective   Blood pressure 110/70, pulse 57, temperature 98.4 °F (36.9 °C), temperature source Oral, resp. rate 19, height 1.6 m (5' 3\"), weight 61.7 kg (136 lb), SpO2 99%.  Physical Exam  Physical Examination:   GENERAL ASSESSMENT: well developed and well nourished  CHEST: normal air exchange, no rales, no rhonchi, no wheezes, respiratory effort normal with no retractions  HEART: regular rate and rhythm, normal S1/S2, no murmurs             The patient (or guardian, if applicable) and other individuals in attendance with the patient were advised that Artificial Intelligence will be utilized during this visit to record, process the conversation to generate a clinical note and to support improvement of the AI technology. The patient (or guardian, if applicable) and other

## 2025-01-20 LAB
25(OH)D3 SERPL-MCNC: >150 NG/ML (ref 30–100)
ALBUMIN SERPL-MCNC: 3.9 G/DL (ref 3.5–5)
ALBUMIN/GLOB SERPL: 1.3 (ref 1.1–2.2)
ALP SERPL-CCNC: 112 U/L (ref 45–117)
ALT SERPL-CCNC: 16 U/L (ref 12–78)
ANION GAP SERPL CALC-SCNC: 6 MMOL/L (ref 2–12)
AST SERPL-CCNC: 18 U/L (ref 15–37)
BASOPHILS # BLD: 0.07 K/UL (ref 0–0.1)
BASOPHILS NFR BLD: 1.3 % (ref 0–1)
BILIRUB SERPL-MCNC: 0.4 MG/DL (ref 0.2–1)
BUN SERPL-MCNC: 15 MG/DL (ref 6–20)
BUN/CREAT SERPL: 19 (ref 12–20)
CALCIUM SERPL-MCNC: 9.6 MG/DL (ref 8.5–10.1)
CHLORIDE SERPL-SCNC: 104 MMOL/L (ref 97–108)
CHOLEST SERPL-MCNC: 205 MG/DL
CO2 SERPL-SCNC: 30 MMOL/L (ref 21–32)
CREAT SERPL-MCNC: 0.78 MG/DL (ref 0.55–1.02)
DIFFERENTIAL METHOD BLD: ABNORMAL
EOSINOPHIL # BLD: 0.1 K/UL (ref 0–0.4)
EOSINOPHIL NFR BLD: 1.8 % (ref 0–7)
ERYTHROCYTE [DISTWIDTH] IN BLOOD BY AUTOMATED COUNT: 13.7 % (ref 11.5–14.5)
EST. AVERAGE GLUCOSE BLD GHB EST-MCNC: 117 MG/DL
GLOBULIN SER CALC-MCNC: 3.1 G/DL (ref 2–4)
GLUCOSE SERPL-MCNC: 113 MG/DL (ref 65–100)
HBA1C MFR BLD: 5.7 % (ref 4–5.6)
HCT VFR BLD AUTO: 36.1 % (ref 35–47)
HDLC SERPL-MCNC: 84 MG/DL
HDLC SERPL: 2.4 (ref 0–5)
HGB BLD-MCNC: 11.4 G/DL (ref 11.5–16)
IMM GRANULOCYTES # BLD AUTO: 0 K/UL (ref 0–0.04)
IMM GRANULOCYTES NFR BLD AUTO: 0 % (ref 0–0.5)
LDLC SERPL CALC-MCNC: 98.6 MG/DL (ref 0–100)
LYMPHOCYTES # BLD: 2 K/UL (ref 0.8–3.5)
LYMPHOCYTES NFR BLD: 36.7 % (ref 12–49)
MCH RBC QN AUTO: 30 PG (ref 26–34)
MCHC RBC AUTO-ENTMCNC: 31.6 G/DL (ref 30–36.5)
MCV RBC AUTO: 95 FL (ref 80–99)
MONOCYTES # BLD: 0.44 K/UL (ref 0–1)
MONOCYTES NFR BLD: 8.1 % (ref 5–13)
NEUTS SEG # BLD: 2.84 K/UL (ref 1.8–8)
NEUTS SEG NFR BLD: 52.1 % (ref 32–75)
NRBC # BLD: 0 K/UL (ref 0–0.01)
NRBC BLD-RTO: 0 PER 100 WBC
PLATELET # BLD AUTO: 225 K/UL (ref 150–400)
PMV BLD AUTO: 11.1 FL (ref 8.9–12.9)
POTASSIUM SERPL-SCNC: 4.1 MMOL/L (ref 3.5–5.1)
PROT SERPL-MCNC: 7 G/DL (ref 6.4–8.2)
RBC # BLD AUTO: 3.8 M/UL (ref 3.8–5.2)
SODIUM SERPL-SCNC: 140 MMOL/L (ref 136–145)
TRIGL SERPL-MCNC: 112 MG/DL
TSH SERPL DL<=0.05 MIU/L-ACNC: 0.39 UIU/ML (ref 0.36–3.74)
VLDLC SERPL CALC-MCNC: 22.4 MG/DL
WBC # BLD AUTO: 5.5 K/UL (ref 3.6–11)

## 2025-01-30 ENCOUNTER — COMMUNITY OUTREACH (OUTPATIENT)
Facility: CLINIC | Age: 65
End: 2025-01-30

## 2025-01-30 NOTE — PROGRESS NOTES
Patient's HM shows they are overdue for Mammogram.  Care Everywhere and  files searched.  No results to attach to order nor HM updated.

## 2025-02-24 RX ORDER — BUTALBITAL, ACETAMINOPHEN AND CAFFEINE 50; 325; 40 MG/1; MG/1; MG/1
TABLET ORAL
Qty: 45 TABLET | Refills: 0 | Status: SHIPPED | OUTPATIENT
Start: 2025-02-24

## 2025-03-26 RX ORDER — BUTALBITAL, ACETAMINOPHEN AND CAFFEINE 50; 325; 40 MG/1; MG/1; MG/1
TABLET ORAL
Qty: 45 TABLET | Refills: 0 | Status: SHIPPED | OUTPATIENT
Start: 2025-03-26

## 2025-05-03 DIAGNOSIS — G43.009 MIGRAINE WITHOUT AURA AND WITHOUT STATUS MIGRAINOSUS, NOT INTRACTABLE: Primary | ICD-10-CM

## 2025-05-05 RX ORDER — BUTALBITAL, ACETAMINOPHEN AND CAFFEINE 50; 325; 40 MG/1; MG/1; MG/1
TABLET ORAL
Qty: 45 TABLET | Refills: 0 | Status: SHIPPED | OUTPATIENT
Start: 2025-05-05

## 2025-05-13 ENCOUNTER — TELEPHONE (OUTPATIENT)
Facility: CLINIC | Age: 65
End: 2025-05-13

## 2025-05-13 DIAGNOSIS — G43.009 MIGRAINE WITHOUT AURA AND WITHOUT STATUS MIGRAINOSUS, NOT INTRACTABLE: ICD-10-CM

## 2025-05-13 NOTE — TELEPHONE ENCOUNTER
Patient called to let us know that her pharmacy is requesting a paper prescription for her Butalbital-Acetaminophen-Caffeine (Fioricet, ESGIC) -40 MG tablets.     Next appt: 01/19/2026

## 2025-05-13 NOTE — TELEPHONE ENCOUNTER
Contacted pharmacy they we sent rx to on 5/5/25 and was advised the prefer the e-scribe.  They did not receive the script that was sent on 5/5/25.  Please resend.

## 2025-05-14 RX ORDER — BUTALBITAL, ACETAMINOPHEN AND CAFFEINE 50; 325; 40 MG/1; MG/1; MG/1
TABLET ORAL
Qty: 45 TABLET | Refills: 0 | Status: SHIPPED | OUTPATIENT
Start: 2025-05-14

## 2025-06-12 DIAGNOSIS — G43.009 MIGRAINE WITHOUT AURA AND WITHOUT STATUS MIGRAINOSUS, NOT INTRACTABLE: ICD-10-CM

## 2025-06-12 RX ORDER — BUTALBITAL, ACETAMINOPHEN AND CAFFEINE 50; 325; 40 MG/1; MG/1; MG/1
TABLET ORAL
Qty: 45 TABLET | Refills: 0 | Status: SHIPPED | OUTPATIENT
Start: 2025-06-12

## 2025-07-14 DIAGNOSIS — G43.009 MIGRAINE WITHOUT AURA AND WITHOUT STATUS MIGRAINOSUS, NOT INTRACTABLE: ICD-10-CM

## 2025-07-14 RX ORDER — BUTALBITAL, ACETAMINOPHEN AND CAFFEINE 50; 325; 40 MG/1; MG/1; MG/1
TABLET ORAL
Qty: 45 TABLET | Refills: 0 | Status: SHIPPED | OUTPATIENT
Start: 2025-07-14

## 2025-07-16 RX ORDER — METHOCARBAMOL 750 MG/1
750 TABLET, FILM COATED ORAL 3 TIMES DAILY PRN
Qty: 270 TABLET | Refills: 0 | Status: SHIPPED | OUTPATIENT
Start: 2025-07-16

## 2025-08-16 DIAGNOSIS — G43.009 MIGRAINE WITHOUT AURA AND WITHOUT STATUS MIGRAINOSUS, NOT INTRACTABLE: ICD-10-CM

## 2025-08-18 RX ORDER — BUTALBITAL, ACETAMINOPHEN AND CAFFEINE 50; 325; 40 MG/1; MG/1; MG/1
TABLET ORAL
Qty: 45 TABLET | Refills: 0 | Status: SHIPPED | OUTPATIENT
Start: 2025-08-18